# Patient Record
Sex: MALE | Race: WHITE
[De-identification: names, ages, dates, MRNs, and addresses within clinical notes are randomized per-mention and may not be internally consistent; named-entity substitution may affect disease eponyms.]

---

## 2018-05-04 ENCOUNTER — HOSPITAL ENCOUNTER (OUTPATIENT)
Dept: HOSPITAL 100 - MTRAD | Age: 73
End: 2018-05-04
Payer: MEDICARE

## 2018-05-04 DIAGNOSIS — R05: Primary | ICD-10-CM

## 2018-05-04 PROCEDURE — 71046 X-RAY EXAM CHEST 2 VIEWS: CPT

## 2018-07-10 ENCOUNTER — HOSPITAL ENCOUNTER (OUTPATIENT)
Dept: HOSPITAL 100 - ED | Age: 73
Setting detail: OBSERVATION
Discharge: HOME | End: 2018-07-10
Payer: MEDICARE

## 2018-07-10 VITALS
HEART RATE: 73 BPM | OXYGEN SATURATION: 99 % | RESPIRATION RATE: 16 BRPM | SYSTOLIC BLOOD PRESSURE: 123 MMHG | DIASTOLIC BLOOD PRESSURE: 67 MMHG | TEMPERATURE: 98.6 F

## 2018-07-10 VITALS
RESPIRATION RATE: 18 BRPM | HEART RATE: 61 BPM | DIASTOLIC BLOOD PRESSURE: 65 MMHG | SYSTOLIC BLOOD PRESSURE: 134 MMHG | OXYGEN SATURATION: 100 %

## 2018-07-10 VITALS
TEMPERATURE: 98.42 F | RESPIRATION RATE: 16 BRPM | DIASTOLIC BLOOD PRESSURE: 72 MMHG | OXYGEN SATURATION: 99 % | HEART RATE: 60 BPM | SYSTOLIC BLOOD PRESSURE: 166 MMHG

## 2018-07-10 VITALS
SYSTOLIC BLOOD PRESSURE: 134 MMHG | HEART RATE: 59 BPM | RESPIRATION RATE: 16 BRPM | OXYGEN SATURATION: 100 % | DIASTOLIC BLOOD PRESSURE: 65 MMHG

## 2018-07-10 VITALS
RESPIRATION RATE: 15 BRPM | BODY MASS INDEX: 28.2 KG/M2 | SYSTOLIC BLOOD PRESSURE: 153 MMHG | DIASTOLIC BLOOD PRESSURE: 89 MMHG | BODY MASS INDEX: 27.1 KG/M2 | TEMPERATURE: 98.06 F | OXYGEN SATURATION: 97 % | HEART RATE: 67 BPM | BODY MASS INDEX: 27.2 KG/M2

## 2018-07-10 VITALS — HEART RATE: 59 BPM

## 2018-07-10 VITALS — HEART RATE: 65 BPM

## 2018-07-10 VITALS — HEART RATE: 63 BPM

## 2018-07-10 DIAGNOSIS — Z79.899: ICD-10-CM

## 2018-07-10 DIAGNOSIS — Z79.02: ICD-10-CM

## 2018-07-10 DIAGNOSIS — E03.9: ICD-10-CM

## 2018-07-10 DIAGNOSIS — I10: ICD-10-CM

## 2018-07-10 DIAGNOSIS — Z79.84: ICD-10-CM

## 2018-07-10 DIAGNOSIS — I25.10: ICD-10-CM

## 2018-07-10 DIAGNOSIS — I25.2: ICD-10-CM

## 2018-07-10 DIAGNOSIS — R42: ICD-10-CM

## 2018-07-10 DIAGNOSIS — E78.5: ICD-10-CM

## 2018-07-10 DIAGNOSIS — E11.9: ICD-10-CM

## 2018-07-10 DIAGNOSIS — R07.89: Primary | ICD-10-CM

## 2018-07-10 DIAGNOSIS — Z95.1: ICD-10-CM

## 2018-07-10 DIAGNOSIS — Z87.891: ICD-10-CM

## 2018-07-10 DIAGNOSIS — Z79.82: ICD-10-CM

## 2018-07-10 LAB
ANION GAP: 10 (ref 5–15)
BUN SERPL-MCNC: 16 MG/DL (ref 7–18)
BUN/CREAT RATIO: 13 RATIO (ref 10–20)
CALCIUM SERPL-MCNC: 9 MG/DL (ref 8.5–10.1)
CARBON DIOXIDE: 28 MMOL/L (ref 21–32)
CHLORIDE: 103 MMOL/L (ref 98–107)
DEPRECATED RDW RBC: 41.6 FL (ref 35.1–43.9)
DIFFERENTIAL INDICATED: (no result)
ERYTHROCYTE [DISTWIDTH] IN BLOOD: 12.8 % (ref 11.6–14.6)
EST GLOM FILT RATE - AFR AMER: 74 ML/MIN (ref 60–?)
ESTIMATED CREATININE CLEARANCE: 51.75 ML/MIN
GLUCOSE: 112 MG/DL (ref 74–106)
HCT VFR BLD AUTO: 40.4 % (ref 40–54)
HEMOGLOBIN: 13.9 G/DL (ref 13–16.5)
HGB BLD-MCNC: 13.9 G/DL (ref 13–16.5)
IMMATURE GRANULOCYTES COUNT: 0 X10^3/UL (ref 0–0)
MCV RBC: 89.8 FL (ref 80–94)
MEAN CORP HGB CONC: 34.4 G/GL (ref 32–36)
MEAN PLATELET VOL.: 9.4 FL (ref 6.2–12)
PLATELET # BLD: 319 K/MM3 (ref 150–450)
PLATELET COUNT: 319 K/MM3 (ref 150–450)
POSITIVE COUNT: NO
POSITIVE DIFFERENTIAL: NO
POSITIVE MORPHOLOGY: NO
POTASSIUM: 3.3 MMOL/L (ref 3.5–5.1)
RBC # BLD AUTO: 4.5 M/MM3 (ref 4.6–6.2)
RBC DISTRIBUTION WIDTH CV: 12.8 % (ref 11.6–14.6)
RBC DISTRIBUTION WIDTH SD: 41.6 FL (ref 35.1–43.9)
WBC # BLD AUTO: 8.9 K/MM3 (ref 4.4–11)
WHITE BLOOD COUNT: 8.9 K/MM3 (ref 4.4–11)

## 2018-07-10 PROCEDURE — 99218: CPT

## 2018-07-10 PROCEDURE — A4216 STERILE WATER/SALINE, 10 ML: HCPCS

## 2018-07-10 PROCEDURE — 93017 CV STRESS TEST TRACING ONLY: CPT

## 2018-07-10 PROCEDURE — 85025 COMPLETE CBC W/AUTO DIFF WBC: CPT

## 2018-07-10 PROCEDURE — A9500 TC99M SESTAMIBI: HCPCS

## 2018-07-10 PROCEDURE — 71046 X-RAY EXAM CHEST 2 VIEWS: CPT

## 2018-07-10 PROCEDURE — 93005 ELECTROCARDIOGRAM TRACING: CPT

## 2018-07-10 PROCEDURE — 83036 HEMOGLOBIN GLYCOSYLATED A1C: CPT

## 2018-07-10 PROCEDURE — 84484 ASSAY OF TROPONIN QUANT: CPT

## 2018-07-10 PROCEDURE — 80048 BASIC METABOLIC PNL TOTAL CA: CPT

## 2018-07-10 PROCEDURE — 99283 EMERGENCY DEPT VISIT LOW MDM: CPT

## 2018-07-10 PROCEDURE — 82962 GLUCOSE BLOOD TEST: CPT

## 2018-07-10 PROCEDURE — 78452 HT MUSCLE IMAGE SPECT MULT: CPT

## 2018-07-10 PROCEDURE — G0378 HOSPITAL OBSERVATION PER HR: HCPCS

## 2018-07-10 RX ADMIN — Medication 1 CAP: at 13:51

## 2018-08-06 ENCOUNTER — HOSPITAL ENCOUNTER (OUTPATIENT)
Age: 73
End: 2018-08-06
Payer: MEDICARE

## 2018-08-06 DIAGNOSIS — I10: ICD-10-CM

## 2018-08-06 DIAGNOSIS — E87.6: ICD-10-CM

## 2018-08-06 DIAGNOSIS — E78.5: ICD-10-CM

## 2018-08-06 DIAGNOSIS — I25.10: Primary | ICD-10-CM

## 2018-08-06 DIAGNOSIS — N40.0: ICD-10-CM

## 2018-08-06 DIAGNOSIS — Z12.5: ICD-10-CM

## 2018-08-06 LAB
ANION GAP: 10 (ref 5–15)
BUN SERPL-MCNC: 11 MG/DL (ref 7–18)
BUN/CREAT RATIO: 9.2 RATIO (ref 10–20)
CALCIUM SERPL-MCNC: 8.7 MG/DL (ref 8.5–10.1)
CARBON DIOXIDE: 27 MMOL/L (ref 21–32)
CHLORIDE: 103 MMOL/L (ref 98–107)
CHOLEST SERPL-MCNC: 171 MG/DL
EST GLOM FILT RATE - AFR AMER: 77 ML/MIN (ref 60–?)
GLUCOSE: 121 MG/DL (ref 74–106)
POTASSIUM: 3.6 MMOL/L (ref 3.5–5.1)
PSA,TOTAL - ANNUAL SCREEN: 2.22 NG/ML (ref 0–4)
TRIGLYCERIDES: 234 MG/DL
VLDLC SERPL-MCNC: 47 MG/DL (ref 5–40)

## 2018-08-06 PROCEDURE — G0103 PSA SCREENING: HCPCS

## 2018-08-06 PROCEDURE — 84439 ASSAY OF FREE THYROXINE: CPT

## 2018-08-06 PROCEDURE — 36415 COLL VENOUS BLD VENIPUNCTURE: CPT

## 2018-08-06 PROCEDURE — 80061 LIPID PANEL: CPT

## 2018-08-06 PROCEDURE — 84153 ASSAY OF PSA TOTAL: CPT

## 2018-08-06 PROCEDURE — 84443 ASSAY THYROID STIM HORMONE: CPT

## 2018-08-06 PROCEDURE — 80048 BASIC METABOLIC PNL TOTAL CA: CPT

## 2018-12-12 ENCOUNTER — HOSPITAL ENCOUNTER (OUTPATIENT)
Dept: HOSPITAL 100 - MTRAD | Age: 73
End: 2018-12-12
Payer: MEDICARE

## 2018-12-12 VITALS — BODY MASS INDEX: 26.9 KG/M2

## 2018-12-12 DIAGNOSIS — R05: Primary | ICD-10-CM

## 2018-12-12 PROCEDURE — 71046 X-RAY EXAM CHEST 2 VIEWS: CPT

## 2019-02-26 ENCOUNTER — HOSPITAL ENCOUNTER (OUTPATIENT)
Dept: HOSPITAL 100 - MRI | Age: 74
End: 2019-02-26
Payer: MEDICARE

## 2019-02-26 VITALS — BODY MASS INDEX: 26.9 KG/M2

## 2019-02-26 DIAGNOSIS — F03.90: Primary | ICD-10-CM

## 2019-02-26 PROCEDURE — 70551 MRI BRAIN STEM W/O DYE: CPT

## 2019-05-09 ENCOUNTER — HOSPITAL ENCOUNTER (OUTPATIENT)
Age: 74
End: 2019-05-09
Payer: MEDICARE

## 2019-05-09 VITALS — BODY MASS INDEX: 26.9 KG/M2

## 2019-05-09 DIAGNOSIS — E55.9: Primary | ICD-10-CM

## 2019-05-09 DIAGNOSIS — F03.90: ICD-10-CM

## 2019-05-09 LAB
VITAMIN B12: 367 PG/ML (ref 211–911)
VITAMIN D,25 HYDROXY: 9.6 NG/ML (ref 29.95–100.01)

## 2019-05-09 PROCEDURE — 82607 VITAMIN B-12: CPT

## 2019-05-09 PROCEDURE — 36415 COLL VENOUS BLD VENIPUNCTURE: CPT

## 2019-05-09 PROCEDURE — 82306 VITAMIN D 25 HYDROXY: CPT

## 2019-09-29 ENCOUNTER — HOSPITAL ENCOUNTER (OUTPATIENT)
Dept: HOSPITAL 100 - ED | Age: 74
Setting detail: OBSERVATION
LOS: 1 days | Discharge: HOME | End: 2019-09-30
Payer: MEDICARE

## 2019-09-29 VITALS
HEART RATE: 63 BPM | RESPIRATION RATE: 16 BRPM | DIASTOLIC BLOOD PRESSURE: 72 MMHG | SYSTOLIC BLOOD PRESSURE: 150 MMHG | OXYGEN SATURATION: 99 % | TEMPERATURE: 97.5 F

## 2019-09-29 VITALS
RESPIRATION RATE: 17 BRPM | HEART RATE: 56 BPM | DIASTOLIC BLOOD PRESSURE: 68 MMHG | SYSTOLIC BLOOD PRESSURE: 139 MMHG | OXYGEN SATURATION: 100 %

## 2019-09-29 VITALS
SYSTOLIC BLOOD PRESSURE: 130 MMHG | HEART RATE: 58 BPM | RESPIRATION RATE: 15 BRPM | DIASTOLIC BLOOD PRESSURE: 62 MMHG | OXYGEN SATURATION: 100 %

## 2019-09-29 VITALS — OXYGEN SATURATION: 98 %

## 2019-09-29 VITALS — SYSTOLIC BLOOD PRESSURE: 143 MMHG | DIASTOLIC BLOOD PRESSURE: 68 MMHG | HEART RATE: 60 BPM

## 2019-09-29 VITALS
RESPIRATION RATE: 18 BRPM | SYSTOLIC BLOOD PRESSURE: 143 MMHG | DIASTOLIC BLOOD PRESSURE: 68 MMHG | OXYGEN SATURATION: 100 % | HEART RATE: 60 BPM | TEMPERATURE: 97.5 F

## 2019-09-29 VITALS
RESPIRATION RATE: 18 BRPM | OXYGEN SATURATION: 100 % | HEART RATE: 68 BPM | DIASTOLIC BLOOD PRESSURE: 68 MMHG | SYSTOLIC BLOOD PRESSURE: 146 MMHG

## 2019-09-29 VITALS — BODY MASS INDEX: 25.2 KG/M2 | BODY MASS INDEX: 26.9 KG/M2 | BODY MASS INDEX: 24.8 KG/M2

## 2019-09-29 VITALS — TEMPERATURE: 97.88 F

## 2019-09-29 VITALS — TEMPERATURE: 97.52 F

## 2019-09-29 DIAGNOSIS — Z79.899: ICD-10-CM

## 2019-09-29 DIAGNOSIS — I25.2: ICD-10-CM

## 2019-09-29 DIAGNOSIS — N17.9: ICD-10-CM

## 2019-09-29 DIAGNOSIS — I25.5: ICD-10-CM

## 2019-09-29 DIAGNOSIS — Z79.82: ICD-10-CM

## 2019-09-29 DIAGNOSIS — R07.89: Primary | ICD-10-CM

## 2019-09-29 DIAGNOSIS — I25.10: ICD-10-CM

## 2019-09-29 DIAGNOSIS — Z79.84: ICD-10-CM

## 2019-09-29 DIAGNOSIS — Z95.1: ICD-10-CM

## 2019-09-29 DIAGNOSIS — E11.9: ICD-10-CM

## 2019-09-29 DIAGNOSIS — R19.7: ICD-10-CM

## 2019-09-29 DIAGNOSIS — G20: ICD-10-CM

## 2019-09-29 DIAGNOSIS — D64.9: ICD-10-CM

## 2019-09-29 DIAGNOSIS — I10: ICD-10-CM

## 2019-09-29 DIAGNOSIS — R06.02: ICD-10-CM

## 2019-09-29 DIAGNOSIS — R42: ICD-10-CM

## 2019-09-29 DIAGNOSIS — E03.9: ICD-10-CM

## 2019-09-29 DIAGNOSIS — F32.9: ICD-10-CM

## 2019-09-29 DIAGNOSIS — Z79.02: ICD-10-CM

## 2019-09-29 DIAGNOSIS — F41.9: ICD-10-CM

## 2019-09-29 DIAGNOSIS — E78.5: ICD-10-CM

## 2019-09-29 DIAGNOSIS — Z87.891: ICD-10-CM

## 2019-09-29 DIAGNOSIS — R11.0: ICD-10-CM

## 2019-09-29 DIAGNOSIS — F02.80: ICD-10-CM

## 2019-09-29 LAB
ANION GAP: 9 (ref 5–15)
BUN SERPL-MCNC: 30 MG/DL (ref 7–18)
BUN/CREAT RATIO: 15.5 RATIO (ref 10–20)
CALCIUM SERPL-MCNC: 9 MG/DL (ref 8.5–10.1)
CARBON DIOXIDE: 23 MMOL/L (ref 21–32)
CHLORIDE: 105 MMOL/L (ref 98–107)
DEPRECATED RDW RBC: 43.8 FL (ref 35.1–43.9)
ERYTHROCYTE [DISTWIDTH] IN BLOOD: 12.8 % (ref 11.6–14.6)
EST GLOM FILT RATE - AFR AMER: 44 ML/MIN (ref 60–?)
ESTIMATED CREATININE CLEARANCE: 32.49 ML/MIN
GLUCOSE: 167 MG/DL (ref 74–106)
HCT VFR BLD AUTO: 38.6 % (ref 40–54)
HEMOGLOBIN: 12.7 G/DL (ref 13–16.5)
HGB BLD-MCNC: 12.7 G/DL (ref 13–16.5)
IMMATURE GRANULOCYTES COUNT: 0.05 X10^3/UL (ref 0–0)
MAGNESIUM: 1.7 MG/DL (ref 1.6–2.6)
MCV RBC: 93.9 FL (ref 80–94)
MEAN CORP HGB CONC: 32.9 G/DL (ref 32–36)
MEAN PLATELET VOL.: 9.9 FL (ref 6.2–12)
NRBC FLAGGED BY ANALYZER: 0 % (ref 0–5)
PLATELET # BLD: 297 K/MM3 (ref 150–450)
PLATELET COUNT: 297 K/MM3 (ref 150–450)
POTASSIUM: 4 MMOL/L (ref 3.5–5.1)
RBC # BLD AUTO: 4.11 M/MM3 (ref 4.6–6.2)
RBC DISTRIBUTION WIDTH CV: 12.8 % (ref 11.6–14.6)
RBC DISTRIBUTION WIDTH SD: 43.8 FL (ref 35.1–43.9)
WBC # BLD AUTO: 13.5 K/MM3 (ref 4.4–11)
WHITE BLOOD COUNT: 13.5 K/MM3 (ref 4.4–11)

## 2019-09-29 PROCEDURE — 99218: CPT

## 2019-09-29 PROCEDURE — 36415 COLL VENOUS BLD VENIPUNCTURE: CPT

## 2019-09-29 PROCEDURE — 96361 HYDRATE IV INFUSION ADD-ON: CPT

## 2019-09-29 PROCEDURE — 84484 ASSAY OF TROPONIN QUANT: CPT

## 2019-09-29 PROCEDURE — 85025 COMPLETE CBC W/AUTO DIFF WBC: CPT

## 2019-09-29 PROCEDURE — 93017 CV STRESS TEST TRACING ONLY: CPT

## 2019-09-29 PROCEDURE — 80061 LIPID PANEL: CPT

## 2019-09-29 PROCEDURE — 80048 BASIC METABOLIC PNL TOTAL CA: CPT

## 2019-09-29 PROCEDURE — 99285 EMERGENCY DEPT VISIT HI MDM: CPT

## 2019-09-29 PROCEDURE — 96374 THER/PROPH/DIAG INJ IV PUSH: CPT

## 2019-09-29 PROCEDURE — A9500 TC99M SESTAMIBI: HCPCS

## 2019-09-29 PROCEDURE — 83735 ASSAY OF MAGNESIUM: CPT

## 2019-09-29 PROCEDURE — 78452 HT MUSCLE IMAGE SPECT MULT: CPT

## 2019-09-29 PROCEDURE — G0378 HOSPITAL OBSERVATION PER HR: HCPCS

## 2019-09-29 PROCEDURE — A4216 STERILE WATER/SALINE, 10 ML: HCPCS

## 2019-09-29 PROCEDURE — 97166 OT EVAL MOD COMPLEX 45 MIN: CPT

## 2019-09-29 PROCEDURE — 93005 ELECTROCARDIOGRAM TRACING: CPT

## 2019-09-29 PROCEDURE — C8929 TTE W OR WO FOL WCON,DOPPLER: HCPCS

## 2019-09-29 PROCEDURE — 93306 TTE W/DOPPLER COMPLETE: CPT

## 2019-09-29 PROCEDURE — 71045 X-RAY EXAM CHEST 1 VIEW: CPT

## 2019-09-29 PROCEDURE — 97802 MEDICAL NUTRITION INDIV IN: CPT

## 2019-09-29 PROCEDURE — 97162 PT EVAL MOD COMPLEX 30 MIN: CPT

## 2019-09-29 PROCEDURE — 82962 GLUCOSE BLOOD TEST: CPT

## 2019-09-29 RX ADMIN — SODIUM CHLORIDE 1000 ML: 9 INJECTION, SOLUTION INTRAVENOUS at 23:01

## 2019-09-30 VITALS
OXYGEN SATURATION: 98 % | DIASTOLIC BLOOD PRESSURE: 68 MMHG | HEART RATE: 60 BPM | SYSTOLIC BLOOD PRESSURE: 146 MMHG | TEMPERATURE: 97.7 F | RESPIRATION RATE: 14 BRPM

## 2019-09-30 VITALS — DIASTOLIC BLOOD PRESSURE: 63 MMHG | HEART RATE: 60 BPM | SYSTOLIC BLOOD PRESSURE: 135 MMHG

## 2019-09-30 VITALS — HEART RATE: 51 BPM

## 2019-09-30 VITALS
HEART RATE: 54 BPM | RESPIRATION RATE: 18 BRPM | SYSTOLIC BLOOD PRESSURE: 139 MMHG | DIASTOLIC BLOOD PRESSURE: 64 MMHG | TEMPERATURE: 97.3 F | OXYGEN SATURATION: 98 %

## 2019-09-30 VITALS
SYSTOLIC BLOOD PRESSURE: 153 MMHG | OXYGEN SATURATION: 100 % | RESPIRATION RATE: 16 BRPM | DIASTOLIC BLOOD PRESSURE: 70 MMHG | TEMPERATURE: 97.52 F | HEART RATE: 56 BPM

## 2019-09-30 VITALS
SYSTOLIC BLOOD PRESSURE: 135 MMHG | OXYGEN SATURATION: 98 % | DIASTOLIC BLOOD PRESSURE: 63 MMHG | RESPIRATION RATE: 14 BRPM | HEART RATE: 60 BPM | TEMPERATURE: 96.5 F

## 2019-09-30 VITALS — HEART RATE: 55 BPM

## 2019-09-30 VITALS — HEART RATE: 52 BPM

## 2019-09-30 VITALS — OXYGEN SATURATION: 97 %

## 2019-09-30 LAB
ANION GAP: 9 (ref 5–15)
BUN SERPL-MCNC: 26 MG/DL (ref 7–18)
BUN/CREAT RATIO: 16.9 RATIO (ref 10–20)
CALCIUM SERPL-MCNC: 8.6 MG/DL (ref 8.5–10.1)
CARBON DIOXIDE: 24 MMOL/L (ref 21–32)
CHLORIDE: 108 MMOL/L (ref 98–107)
CHOLEST SERPL-MCNC: 119 MG/DL
DEPRECATED RDW RBC: 43.5 FL (ref 35.1–43.9)
ERYTHROCYTE [DISTWIDTH] IN BLOOD: 12.8 % (ref 11.6–14.6)
EST GLOM FILT RATE - AFR AMER: 57 ML/MIN (ref 60–?)
ESTIMATED CREATININE CLEARANCE: 40.71 ML/MIN
GLUCOSE: 100 MG/DL (ref 74–106)
HCT VFR BLD AUTO: 36.1 % (ref 40–54)
HEMOGLOBIN: 12.1 G/DL (ref 13–16.5)
HGB BLD-MCNC: 12.1 G/DL (ref 13–16.5)
IMMATURE GRANULOCYTES COUNT: 0.03 X10^3/UL (ref 0–0)
MCV RBC: 92.3 FL (ref 80–94)
MEAN CORP HGB CONC: 33.5 G/DL (ref 32–36)
MEAN PLATELET VOL.: 9.7 FL (ref 6.2–12)
NRBC FLAGGED BY ANALYZER: 0 % (ref 0–5)
PLATELET # BLD: 257 K/MM3 (ref 150–450)
PLATELET COUNT: 257 K/MM3 (ref 150–450)
POTASSIUM: 4 MMOL/L (ref 3.5–5.1)
RBC # BLD AUTO: 3.91 M/MM3 (ref 4.6–6.2)
RBC DISTRIBUTION WIDTH CV: 12.8 % (ref 11.6–14.6)
RBC DISTRIBUTION WIDTH SD: 43.5 FL (ref 35.1–43.9)
TRIGLYCERIDES: 54 MG/DL
VLDLC SERPL-MCNC: 11 MG/DL (ref 5–40)
WBC # BLD AUTO: 9 K/MM3 (ref 4.4–11)
WHITE BLOOD COUNT: 9 K/MM3 (ref 4.4–11)

## 2019-09-30 RX ADMIN — SODIUM CHLORIDE 125 ML: 9 INJECTION, SOLUTION INTRAVENOUS at 05:59

## 2019-09-30 RX ADMIN — MEMANTINE HYDROCHLORIDE 10 MG: 10 TABLET, FILM COATED ORAL at 10:54

## 2019-09-30 RX ADMIN — Medication 120 ML: at 18:00

## 2020-06-02 ENCOUNTER — HOSPITAL ENCOUNTER (OUTPATIENT)
Dept: HOSPITAL 100 - BIMLAB | Age: 75
End: 2020-06-02
Payer: MEDICARE

## 2020-06-02 VITALS — BODY MASS INDEX: 24.8 KG/M2

## 2020-06-02 DIAGNOSIS — I10: ICD-10-CM

## 2020-06-02 DIAGNOSIS — E11.9: Primary | ICD-10-CM

## 2020-06-02 LAB
ALANINE AMINOTRANSFER ALT/SGPT: 21 U/L (ref 16–61)
ALBUMIN SERPL-MCNC: 3.7 G/DL (ref 3.2–5)
ALKALINE PHOSPHATASE: 71 U/L (ref 45–117)
ANION GAP: 10 (ref 5–15)
AST(SGOT): 15 U/L (ref 15–37)
BUN SERPL-MCNC: 24 MG/DL (ref 7–18)
BUN/CREAT RATIO: 13 RATIO (ref 10–20)
CALCIUM SERPL-MCNC: 9.1 MG/DL (ref 8.5–10.1)
CARBON DIOXIDE: 26 MMOL/L (ref 21–32)
CHLORIDE: 104 MMOL/L (ref 98–107)
CHOLEST SERPL-MCNC: 150 MG/DL
CREAT UR-MCNC: 27.7 MG/DL
DEPRECATED RDW RBC: 44.9 FL (ref 35.1–43.9)
ERYTHROCYTE [DISTWIDTH] IN BLOOD: 13.1 % (ref 11.6–14.6)
EST GLOM FILT RATE - AFR AMER: 46 ML/MIN (ref 60–?)
GLOBULIN: 3.8 G/DL (ref 2.2–4.2)
GLUCOSE: 134 MG/DL (ref 74–106)
HCT VFR BLD AUTO: 36.6 % (ref 40–54)
HEMOGLOBIN: 11.8 G/DL (ref 13–16.5)
HGB BLD-MCNC: 11.8 G/DL (ref 13–16.5)
IMMATURE GRANULOCYTES COUNT: 0.02 X10^3/UL (ref 0–0)
MCV RBC: 93.6 FL (ref 80–94)
MEAN CORP HGB CONC: 32.2 G/DL (ref 32–36)
MEAN PLATELET VOL.: 10 FL (ref 6.2–12)
MICROALBUMIN UR-MCNC: 8.4 MG/L
NRBC FLAGGED BY ANALYZER: 0 % (ref 0–5)
PLATELET # BLD: 274 K/MM3 (ref 150–450)
PLATELET COUNT: 274 K/MM3 (ref 150–450)
POTASSIUM: 3.8 MMOL/L (ref 3.5–5.1)
RBC # BLD AUTO: 3.91 M/MM3 (ref 4.6–6.2)
RBC DISTRIBUTION WIDTH CV: 13.1 % (ref 11.6–14.6)
RBC DISTRIBUTION WIDTH SD: 44.9 FL (ref 35.1–43.9)
TRIGLYCERIDES: 116 MG/DL
VLDLC SERPL-MCNC: 23 MG/DL (ref 5–40)
WBC # BLD AUTO: 8.4 K/MM3 (ref 4.4–11)
WHITE BLOOD COUNT: 8.4 K/MM3 (ref 4.4–11)

## 2020-06-02 PROCEDURE — 36415 COLL VENOUS BLD VENIPUNCTURE: CPT

## 2020-06-02 PROCEDURE — 85025 COMPLETE CBC W/AUTO DIFF WBC: CPT

## 2020-06-02 PROCEDURE — 82043 UR ALBUMIN QUANTITATIVE: CPT

## 2020-06-02 PROCEDURE — 82570 ASSAY OF URINE CREATININE: CPT

## 2020-06-02 PROCEDURE — 80061 LIPID PANEL: CPT

## 2020-06-02 PROCEDURE — 80053 COMPREHEN METABOLIC PANEL: CPT

## 2020-10-06 ENCOUNTER — HOSPITAL ENCOUNTER (OUTPATIENT)
Dept: HOSPITAL 100 - BIMLAB | Age: 75
End: 2020-10-06
Payer: MEDICARE

## 2020-10-06 VITALS — BODY MASS INDEX: 24.8 KG/M2

## 2020-10-06 DIAGNOSIS — E55.9: Primary | ICD-10-CM

## 2020-10-06 DIAGNOSIS — E11.9: ICD-10-CM

## 2020-10-06 LAB
ANION GAP: 2 (ref 5–15)
BUN SERPL-MCNC: 18 MG/DL (ref 7–18)
BUN/CREAT RATIO: 12.3 RATIO (ref 10–20)
CALCIUM SERPL-MCNC: 8.6 MG/DL (ref 8.5–10.1)
CARBON DIOXIDE: 30 MMOL/L (ref 21–32)
CHLORIDE: 104 MMOL/L (ref 98–107)
EST GLOM FILT RATE - AFR AMER: 60 ML/MIN (ref 60–?)
GLUCOSE: 190 MG/DL (ref 74–106)
POTASSIUM: 4 MMOL/L (ref 3.5–5.1)

## 2020-10-06 PROCEDURE — 82306 VITAMIN D 25 HYDROXY: CPT

## 2020-10-06 PROCEDURE — 36415 COLL VENOUS BLD VENIPUNCTURE: CPT

## 2020-10-06 PROCEDURE — 80048 BASIC METABOLIC PNL TOTAL CA: CPT

## 2020-10-07 LAB — VITAMIN D,25 HYDROXY: 40.5 NG/ML

## 2021-02-12 ENCOUNTER — HOSPITAL ENCOUNTER (OUTPATIENT)
Age: 76
End: 2021-02-12
Payer: MEDICARE

## 2021-02-12 VITALS — BODY MASS INDEX: 29 KG/M2

## 2021-02-12 DIAGNOSIS — R26.89: Primary | ICD-10-CM

## 2021-02-12 DIAGNOSIS — R07.9: ICD-10-CM

## 2021-02-12 DIAGNOSIS — F03.90: ICD-10-CM

## 2021-02-12 DIAGNOSIS — I69.322: ICD-10-CM

## 2021-02-12 DIAGNOSIS — E03.9: ICD-10-CM

## 2021-02-12 LAB
ALANINE AMINOTRANSFER ALT/SGPT: 21 U/L (ref 16–61)
ALBUMIN SERPL-MCNC: 3.9 G/DL (ref 3.2–5)
ALKALINE PHOSPHATASE: 108 U/L (ref 45–117)
ANION GAP: 2 (ref 5–15)
AST(SGOT): 17 U/L (ref 15–37)
BUN SERPL-MCNC: 23 MG/DL (ref 7–18)
BUN/CREAT RATIO: 12.2 RATIO (ref 10–20)
CALCIUM SERPL-MCNC: 9 MG/DL (ref 8.5–10.1)
CARBON DIOXIDE: 30 MMOL/L (ref 21–32)
CHLORIDE: 102 MMOL/L (ref 98–107)
CHOLEST SERPL-MCNC: 152 MG/DL
DEPRECATED RDW RBC: 44.7 FL (ref 35.1–43.9)
ERYTHROCYTE [DISTWIDTH] IN BLOOD: 13.6 % (ref 11.6–14.6)
EST GLOM FILT RATE - AFR AMER: 45 ML/MIN (ref 60–?)
FOLATES, (FOLIC ACID): 11.4 NG/ML (ref 3.1–55.4)
GLOBULIN: 4.1 G/DL (ref 2.2–4.2)
GLUCOSE: 167 MG/DL (ref 74–106)
HCT VFR BLD AUTO: 41.2 % (ref 40–54)
HEMOGLOBIN: 13.5 G/DL (ref 13–16.5)
HGB BLD-MCNC: 13.5 G/DL (ref 13–16.5)
MCV RBC: 90.2 FL (ref 80–94)
MEAN CORP HGB CONC: 32.8 G/DL (ref 32–36)
MEAN PLATELET VOL.: 9.5 FL (ref 6.2–12)
PLATELET # BLD: 229 K/MM3 (ref 150–450)
PLATELET COUNT: 229 K/MM3 (ref 150–450)
POTASSIUM: 4.4 MMOL/L (ref 3.5–5.1)
RBC # BLD AUTO: 4.57 M/MM3 (ref 4.6–6.2)
RBC DISTRIBUTION WIDTH CV: 13.6 % (ref 11.6–14.6)
RBC DISTRIBUTION WIDTH SD: 44.7 FL (ref 35.1–43.9)
TRIGLYCERIDES: 127 MG/DL
VITAMIN B12: 322 PG/ML (ref 211–911)
VLDLC SERPL-MCNC: 25 MG/DL (ref 5–40)
WBC # BLD AUTO: 8.5 K/MM3 (ref 4.4–11)
WHITE BLOOD COUNT: 8.5 K/MM3 (ref 4.4–11)

## 2021-02-12 PROCEDURE — 80061 LIPID PANEL: CPT

## 2021-02-12 PROCEDURE — 82607 VITAMIN B-12: CPT

## 2021-02-12 PROCEDURE — 85027 COMPLETE CBC AUTOMATED: CPT

## 2021-02-12 PROCEDURE — 84443 ASSAY THYROID STIM HORMONE: CPT

## 2021-02-12 PROCEDURE — 80053 COMPREHEN METABOLIC PANEL: CPT

## 2021-02-12 PROCEDURE — 82746 ASSAY OF FOLIC ACID SERUM: CPT

## 2021-02-12 PROCEDURE — 36415 COLL VENOUS BLD VENIPUNCTURE: CPT

## 2021-02-12 PROCEDURE — 93880 EXTRACRANIAL BILAT STUDY: CPT

## 2021-04-08 ENCOUNTER — HOSPITAL ENCOUNTER (OUTPATIENT)
Age: 76
End: 2021-04-08
Payer: MEDICARE

## 2021-04-08 VITALS — BODY MASS INDEX: 29 KG/M2

## 2021-04-08 DIAGNOSIS — E03.9: Primary | ICD-10-CM

## 2021-04-08 PROCEDURE — 84443 ASSAY THYROID STIM HORMONE: CPT

## 2021-04-08 PROCEDURE — 36415 COLL VENOUS BLD VENIPUNCTURE: CPT

## 2021-04-26 ENCOUNTER — HOSPITAL ENCOUNTER (OUTPATIENT)
Age: 76
End: 2021-04-26
Payer: MEDICARE

## 2021-04-26 DIAGNOSIS — I10: ICD-10-CM

## 2021-04-26 DIAGNOSIS — E11.9: Primary | ICD-10-CM

## 2021-04-26 LAB
ANION GAP: 7 (ref 5–15)
BUN SERPL-MCNC: 35 MG/DL (ref 7–18)
BUN/CREAT RATIO: 17.9 RATIO (ref 10–20)
CALCIUM SERPL-MCNC: 9.4 MG/DL (ref 8.5–10.1)
CARBON DIOXIDE: 27 MMOL/L (ref 21–32)
CHLORIDE: 103 MMOL/L (ref 98–107)
EST GLOM FILT RATE - AFR AMER: 43 ML/MIN (ref 60–?)
GLUCOSE: 306 MG/DL (ref 74–106)
POTASSIUM: 4.1 MMOL/L (ref 3.5–5.1)

## 2021-04-26 PROCEDURE — 36415 COLL VENOUS BLD VENIPUNCTURE: CPT

## 2021-04-26 PROCEDURE — 80048 BASIC METABOLIC PNL TOTAL CA: CPT

## 2021-08-01 ENCOUNTER — HOSPITAL ENCOUNTER (OUTPATIENT)
Dept: HOSPITAL 100 - ED | Age: 76
Setting detail: OBSERVATION
LOS: 1 days | Discharge: HOME | End: 2021-08-02
Payer: MEDICARE

## 2021-08-01 VITALS
DIASTOLIC BLOOD PRESSURE: 76 MMHG | OXYGEN SATURATION: 99 % | SYSTOLIC BLOOD PRESSURE: 167 MMHG | HEART RATE: 63 BPM | RESPIRATION RATE: 15 BRPM

## 2021-08-01 VITALS
SYSTOLIC BLOOD PRESSURE: 188 MMHG | RESPIRATION RATE: 15 BRPM | OXYGEN SATURATION: 99 % | DIASTOLIC BLOOD PRESSURE: 94 MMHG | TEMPERATURE: 97.8 F | HEART RATE: 71 BPM

## 2021-08-01 VITALS
DIASTOLIC BLOOD PRESSURE: 76 MMHG | RESPIRATION RATE: 14 BRPM | HEART RATE: 59 BPM | SYSTOLIC BLOOD PRESSURE: 213 MMHG | OXYGEN SATURATION: 95 % | TEMPERATURE: 97.5 F

## 2021-08-01 VITALS — BODY MASS INDEX: 25.1 KG/M2 | BODY MASS INDEX: 27.3 KG/M2

## 2021-08-01 VITALS
TEMPERATURE: 98.24 F | RESPIRATION RATE: 18 BRPM | SYSTOLIC BLOOD PRESSURE: 176 MMHG | HEART RATE: 65 BPM | OXYGEN SATURATION: 98 % | DIASTOLIC BLOOD PRESSURE: 90 MMHG

## 2021-08-01 VITALS — HEART RATE: 65 BPM | DIASTOLIC BLOOD PRESSURE: 90 MMHG | SYSTOLIC BLOOD PRESSURE: 176 MMHG

## 2021-08-01 VITALS
DIASTOLIC BLOOD PRESSURE: 74 MMHG | RESPIRATION RATE: 16 BRPM | OXYGEN SATURATION: 98 % | HEART RATE: 55 BPM | SYSTOLIC BLOOD PRESSURE: 171 MMHG

## 2021-08-01 DIAGNOSIS — R19.7: ICD-10-CM

## 2021-08-01 DIAGNOSIS — D64.9: ICD-10-CM

## 2021-08-01 DIAGNOSIS — Z87.891: ICD-10-CM

## 2021-08-01 DIAGNOSIS — Z79.82: ICD-10-CM

## 2021-08-01 DIAGNOSIS — R06.02: ICD-10-CM

## 2021-08-01 DIAGNOSIS — R11.2: Primary | ICD-10-CM

## 2021-08-01 DIAGNOSIS — G31.83: ICD-10-CM

## 2021-08-01 DIAGNOSIS — Z79.899: ICD-10-CM

## 2021-08-01 DIAGNOSIS — Z95.1: ICD-10-CM

## 2021-08-01 DIAGNOSIS — E11.22: ICD-10-CM

## 2021-08-01 DIAGNOSIS — F41.9: ICD-10-CM

## 2021-08-01 DIAGNOSIS — Z79.02: ICD-10-CM

## 2021-08-01 DIAGNOSIS — E78.5: ICD-10-CM

## 2021-08-01 DIAGNOSIS — I12.9: ICD-10-CM

## 2021-08-01 DIAGNOSIS — N18.32: ICD-10-CM

## 2021-08-01 DIAGNOSIS — F02.80: ICD-10-CM

## 2021-08-01 DIAGNOSIS — F32.9: ICD-10-CM

## 2021-08-01 DIAGNOSIS — I25.10: ICD-10-CM

## 2021-08-01 DIAGNOSIS — Z79.4: ICD-10-CM

## 2021-08-01 DIAGNOSIS — I25.2: ICD-10-CM

## 2021-08-01 DIAGNOSIS — E03.9: ICD-10-CM

## 2021-08-01 LAB
ALANINE AMINOTRANSFER ALT/SGPT: 24 U/L (ref 16–61)
ALBUMIN SERPL-MCNC: 3.8 G/DL (ref 3.2–5)
ALKALINE PHOSPHATASE: 108 U/L (ref 45–117)
ANION GAP: 6 (ref 5–15)
AST(SGOT): 23 U/L (ref 15–37)
BUN SERPL-MCNC: 20 MG/DL (ref 7–18)
BUN/CREAT RATIO: 13.1 RATIO (ref 10–20)
CALCIUM SERPL-MCNC: 8.8 MG/DL (ref 8.5–10.1)
CARBON DIOXIDE: 25 MMOL/L (ref 21–32)
CHLORIDE: 103 MMOL/L (ref 98–107)
DEPRECATED RDW RBC: 40 FL (ref 35.1–43.9)
ERYTHROCYTE [DISTWIDTH] IN BLOOD: 12.1 % (ref 11.6–14.6)
EST GLOM FILT RATE - AFR AMER: 57 ML/MIN (ref 60–?)
ESTIMATED CREATININE CLEARANCE: 39.74 ML/MIN
GLOBULIN: 3.9 G/DL (ref 2.2–4.2)
GLUCOSE: 264 MG/DL (ref 74–106)
HCT VFR BLD AUTO: 42.3 % (ref 40–54)
HEMOGLOBIN: 14.4 G/DL (ref 13–16.5)
HGB BLD-MCNC: 14.4 G/DL (ref 13–16.5)
IMMATURE GRANULOCYTES COUNT: 0.05 X10^3/UL (ref 0–0)
LIPASE: 563 U/L (ref 73–393)
MCV RBC: 90 FL (ref 80–94)
MEAN CORP HGB CONC: 34 G/DL (ref 32–36)
MEAN PLATELET VOL.: 9.4 FL (ref 6.2–12)
NRBC FLAGGED BY ANALYZER: 0 % (ref 0–5)
PLATELET # BLD: 227 K/MM3 (ref 150–450)
PLATELET COUNT: 227 K/MM3 (ref 150–450)
POTASSIUM: 4 MMOL/L (ref 3.5–5.1)
RBC # BLD AUTO: 4.7 M/MM3 (ref 4.6–6.2)
RBC DISTRIBUTION WIDTH CV: 12.1 % (ref 11.6–14.6)
RBC DISTRIBUTION WIDTH SD: 40 FL (ref 35.1–43.9)
TROPONIN-I HS: 4.8 PG/ML (ref 3–78.5)
WBC # BLD AUTO: 11.7 K/MM3 (ref 4.4–11)
WHITE BLOOD COUNT: 11.7 K/MM3 (ref 4.4–11)

## 2021-08-01 PROCEDURE — 99285 EMERGENCY DEPT VISIT HI MDM: CPT

## 2021-08-01 PROCEDURE — G0378 HOSPITAL OBSERVATION PER HR: HCPCS

## 2021-08-01 PROCEDURE — 99218: CPT

## 2021-08-01 PROCEDURE — 82274 ASSAY TEST FOR BLOOD FECAL: CPT

## 2021-08-01 PROCEDURE — 83690 ASSAY OF LIPASE: CPT

## 2021-08-01 PROCEDURE — 96361 HYDRATE IV INFUSION ADD-ON: CPT

## 2021-08-01 PROCEDURE — 97162 PT EVAL MOD COMPLEX 30 MIN: CPT

## 2021-08-01 PROCEDURE — 96372 THER/PROPH/DIAG INJ SC/IM: CPT

## 2021-08-01 PROCEDURE — A4216 STERILE WATER/SALINE, 10 ML: HCPCS

## 2021-08-01 PROCEDURE — 36415 COLL VENOUS BLD VENIPUNCTURE: CPT

## 2021-08-01 PROCEDURE — 97166 OT EVAL MOD COMPLEX 45 MIN: CPT

## 2021-08-01 PROCEDURE — 82962 GLUCOSE BLOOD TEST: CPT

## 2021-08-01 PROCEDURE — 85025 COMPLETE CBC W/AUTO DIFF WBC: CPT

## 2021-08-01 PROCEDURE — 96375 TX/PRO/DX INJ NEW DRUG ADDON: CPT

## 2021-08-01 PROCEDURE — 96365 THER/PROPH/DIAG IV INF INIT: CPT

## 2021-08-01 PROCEDURE — 83605 ASSAY OF LACTIC ACID: CPT

## 2021-08-01 PROCEDURE — 84484 ASSAY OF TROPONIN QUANT: CPT

## 2021-08-01 PROCEDURE — 74177 CT ABD & PELVIS W/CONTRAST: CPT

## 2021-08-01 PROCEDURE — 71045 X-RAY EXAM CHEST 1 VIEW: CPT

## 2021-08-01 PROCEDURE — 80053 COMPREHEN METABOLIC PANEL: CPT

## 2021-08-01 RX ADMIN — MEMANTINE HYDROCHLORIDE 5 MG: 5 TABLET, FILM COATED ORAL at 20:52

## 2021-08-01 RX ADMIN — SODIUM CHLORIDE 150 ML: 9 INJECTION, SOLUTION INTRAVENOUS at 14:40

## 2021-08-01 RX ADMIN — SODIUM CHLORIDE 125 ML: 9 INJECTION, SOLUTION INTRAVENOUS at 20:34

## 2021-08-01 RX ADMIN — HEPARIN SODIUM 5000 UNIT: 5000 INJECTION, SOLUTION INTRAVENOUS; SUBCUTANEOUS at 20:47

## 2021-08-02 VITALS
DIASTOLIC BLOOD PRESSURE: 77 MMHG | HEART RATE: 54 BPM | TEMPERATURE: 97.7 F | RESPIRATION RATE: 18 BRPM | OXYGEN SATURATION: 97 % | SYSTOLIC BLOOD PRESSURE: 142 MMHG

## 2021-08-02 VITALS
DIASTOLIC BLOOD PRESSURE: 67 MMHG | OXYGEN SATURATION: 98 % | HEART RATE: 58 BPM | TEMPERATURE: 97.88 F | SYSTOLIC BLOOD PRESSURE: 145 MMHG | RESPIRATION RATE: 18 BRPM

## 2021-08-02 VITALS
RESPIRATION RATE: 18 BRPM | DIASTOLIC BLOOD PRESSURE: 67 MMHG | TEMPERATURE: 97.8 F | SYSTOLIC BLOOD PRESSURE: 145 MMHG | HEART RATE: 58 BPM | OXYGEN SATURATION: 98 %

## 2021-08-02 VITALS
HEART RATE: 59 BPM | OXYGEN SATURATION: 96 % | SYSTOLIC BLOOD PRESSURE: 139 MMHG | DIASTOLIC BLOOD PRESSURE: 48 MMHG | RESPIRATION RATE: 18 BRPM | TEMPERATURE: 97.7 F

## 2021-08-02 VITALS — SYSTOLIC BLOOD PRESSURE: 139 MMHG | DIASTOLIC BLOOD PRESSURE: 48 MMHG | HEART RATE: 59 BPM

## 2021-08-02 LAB
ALANINE AMINOTRANSFER ALT/SGPT: 19 U/L (ref 16–61)
ALBUMIN SERPL-MCNC: 3.2 G/DL (ref 3.2–5)
ALKALINE PHOSPHATASE: 99 U/L (ref 45–117)
ANION GAP: 5 (ref 5–15)
AST(SGOT): 17 U/L (ref 15–37)
BUN SERPL-MCNC: 16 MG/DL (ref 7–18)
BUN/CREAT RATIO: 13.7 RATIO (ref 10–20)
CALCIUM SERPL-MCNC: 8.3 MG/DL (ref 8.5–10.1)
CARBON DIOXIDE: 26 MMOL/L (ref 21–32)
CHLORIDE: 108 MMOL/L (ref 98–107)
DEPRECATED RDW RBC: 39.9 FL (ref 35.1–43.9)
ERYTHROCYTE [DISTWIDTH] IN BLOOD: 12.3 % (ref 11.6–14.6)
EST GLOM FILT RATE - AFR AMER: 78 ML/MIN (ref 60–?)
ESTIMATED CREATININE CLEARANCE: 50.22 ML/MIN
GLOBULIN: 3.4 G/DL (ref 2.2–4.2)
GLUCOSE: 98 MG/DL (ref 74–106)
HCT VFR BLD AUTO: 36.9 % (ref 40–54)
HEMOGLOBIN: 12.6 G/DL (ref 13–16.5)
HGB BLD-MCNC: 12.6 G/DL (ref 13–16.5)
IMMATURE GRANULOCYTES COUNT: 0.03 X10^3/UL (ref 0–0)
MCV RBC: 88.7 FL (ref 80–94)
MEAN CORP HGB CONC: 34.1 G/DL (ref 32–36)
MEAN PLATELET VOL.: 9.6 FL (ref 6.2–12)
NRBC FLAGGED BY ANALYZER: 0 % (ref 0–5)
PLATELET # BLD: 231 K/MM3 (ref 150–450)
PLATELET COUNT: 231 K/MM3 (ref 150–450)
POTASSIUM: 3.6 MMOL/L (ref 3.5–5.1)
RBC # BLD AUTO: 4.16 M/MM3 (ref 4.6–6.2)
RBC DISTRIBUTION WIDTH CV: 12.3 % (ref 11.6–14.6)
RBC DISTRIBUTION WIDTH SD: 39.9 FL (ref 35.1–43.9)
WBC # BLD AUTO: 9.1 K/MM3 (ref 4.4–11)
WHITE BLOOD COUNT: 9.1 K/MM3 (ref 4.4–11)

## 2021-08-02 RX ADMIN — HEPARIN SODIUM 5000 UNIT: 5000 INJECTION, SOLUTION INTRAVENOUS; SUBCUTANEOUS at 09:43

## 2021-08-02 RX ADMIN — SODIUM CHLORIDE 125 ML: 9 INJECTION, SOLUTION INTRAVENOUS at 03:58

## 2021-08-02 RX ADMIN — MEMANTINE HYDROCHLORIDE 5 MG: 5 TABLET, FILM COATED ORAL at 08:26

## 2021-09-13 ENCOUNTER — HOSPITAL ENCOUNTER (OUTPATIENT)
Dept: HOSPITAL 100 - BIMLAB | Age: 76
End: 2021-09-13
Payer: MEDICARE

## 2021-09-13 DIAGNOSIS — F02.80: ICD-10-CM

## 2021-09-13 DIAGNOSIS — R45.4: ICD-10-CM

## 2021-09-13 DIAGNOSIS — G31.83: Primary | ICD-10-CM

## 2021-09-13 LAB
ALANINE AMINOTRANSFER ALT/SGPT: 22 U/L (ref 16–61)
ALBUMIN SERPL-MCNC: 3.8 G/DL (ref 3.2–5)
ALKALINE PHOSPHATASE: 111 U/L (ref 45–117)
ANION GAP: 9 (ref 5–15)
AST(SGOT): 18 U/L (ref 15–37)
BUN SERPL-MCNC: 22 MG/DL (ref 7–18)
BUN/CREAT RATIO: 15.2 RATIO (ref 10–20)
CALCIUM SERPL-MCNC: 8.9 MG/DL (ref 8.5–10.1)
CARBON DIOXIDE: 25 MMOL/L (ref 21–32)
CHLORIDE: 105 MMOL/L (ref 98–107)
DEPRECATED RDW RBC: 42.2 FL (ref 35.1–43.9)
ERYTHROCYTE [DISTWIDTH] IN BLOOD: 12.9 % (ref 11.6–14.6)
EST GLOM FILT RATE - AFR AMER: 61 ML/MIN (ref 60–?)
GLOBULIN: 4.1 G/DL (ref 2.2–4.2)
GLUCOSE: 142 MG/DL (ref 74–106)
HCT VFR BLD AUTO: 44.8 % (ref 40–54)
HEMOGLOBIN: 15.3 G/DL (ref 13–16.5)
HGB BLD-MCNC: 15.3 G/DL (ref 13–16.5)
IMMATURE GRANULOCYTES COUNT: 0.03 X10^3/UL (ref 0–0)
MCV RBC: 89.4 FL (ref 80–94)
MEAN CORP HGB CONC: 34.2 G/DL (ref 32–36)
MEAN PLATELET VOL.: 9.8 FL (ref 6.2–12)
NRBC FLAGGED BY ANALYZER: 0 % (ref 0–5)
PLATELET # BLD: 282 K/MM3 (ref 150–450)
PLATELET COUNT: 282 K/MM3 (ref 150–450)
POTASSIUM: 3.6 MMOL/L (ref 3.5–5.1)
RBC # BLD AUTO: 5.01 M/MM3 (ref 4.6–6.2)
RBC DISTRIBUTION WIDTH CV: 12.9 % (ref 11.6–14.6)
RBC DISTRIBUTION WIDTH SD: 42.2 FL (ref 35.1–43.9)
WBC # BLD AUTO: 9.6 K/MM3 (ref 4.4–11)
WHITE BLOOD COUNT: 9.6 K/MM3 (ref 4.4–11)

## 2021-09-13 PROCEDURE — 80053 COMPREHEN METABOLIC PANEL: CPT

## 2021-09-13 PROCEDURE — 84443 ASSAY THYROID STIM HORMONE: CPT

## 2021-09-13 PROCEDURE — 85025 COMPLETE CBC W/AUTO DIFF WBC: CPT

## 2021-09-13 PROCEDURE — 36415 COLL VENOUS BLD VENIPUNCTURE: CPT

## 2022-01-10 ENCOUNTER — HOSPITAL ENCOUNTER (OUTPATIENT)
Dept: HOSPITAL 100 - BIMLAB | Age: 77
Discharge: ANOTHER HOSPITAL | End: 2022-01-10
Payer: MEDICARE

## 2022-01-10 DIAGNOSIS — I10: Primary | ICD-10-CM

## 2022-01-10 DIAGNOSIS — E03.9: ICD-10-CM

## 2022-01-10 DIAGNOSIS — E11.69: ICD-10-CM

## 2022-01-10 LAB
ANION GAP: 8 (ref 5–15)
BUN SERPL-MCNC: 21 MG/DL (ref 7–18)
BUN/CREAT RATIO: 12.9 RATIO (ref 10–20)
CALCIUM SERPL-MCNC: 9 MG/DL (ref 8.5–10.1)
CARBON DIOXIDE: 27 MMOL/L (ref 21–32)
CHLORIDE: 105 MMOL/L (ref 98–107)
EST GLOM FILT RATE - AFR AMER: 53 ML/MIN (ref 60–?)
GLUCOSE: 145 MG/DL (ref 74–106)
POTASSIUM: 4.1 MMOL/L (ref 3.5–5.1)

## 2022-01-10 PROCEDURE — 84443 ASSAY THYROID STIM HORMONE: CPT

## 2022-01-10 PROCEDURE — 36415 COLL VENOUS BLD VENIPUNCTURE: CPT

## 2022-01-10 PROCEDURE — 83036 HEMOGLOBIN GLYCOSYLATED A1C: CPT

## 2022-01-10 PROCEDURE — 80048 BASIC METABOLIC PNL TOTAL CA: CPT

## 2022-04-11 ENCOUNTER — HOSPITAL ENCOUNTER (OUTPATIENT)
Dept: HOSPITAL 100 - BIMLAB | Age: 77
Discharge: HOME | End: 2022-04-11
Payer: MEDICARE

## 2022-04-11 DIAGNOSIS — I10: Primary | ICD-10-CM

## 2022-04-11 DIAGNOSIS — E78.5: ICD-10-CM

## 2022-04-11 LAB
ALANINE AMINOTRANSFER ALT/SGPT: 21 U/L (ref 16–61)
ALBUMIN SERPL-MCNC: 3.8 G/DL (ref 3.2–5)
ALKALINE PHOSPHATASE: 101 U/L (ref 45–117)
ANION GAP: 4 (ref 5–15)
AST(SGOT): 12 U/L (ref 15–37)
BUN SERPL-MCNC: 23 MG/DL (ref 7–18)
BUN/CREAT RATIO: 13.1 RATIO (ref 10–20)
CALCIUM SERPL-MCNC: 8.5 MG/DL (ref 8.5–10.1)
CARBON DIOXIDE: 28 MMOL/L (ref 21–32)
CHLORIDE: 102 MMOL/L (ref 98–107)
DEPRECATED RDW RBC: 40.3 FL (ref 35.1–43.9)
ERYTHROCYTE [DISTWIDTH] IN BLOOD: 12.3 % (ref 11.6–14.6)
EST GLOM FILT RATE - AFR AMER: 49 ML/MIN (ref 60–?)
GLOBULIN: 3.8 G/DL (ref 2.2–4.2)
GLUCOSE: 296 MG/DL (ref 74–106)
HCT VFR BLD AUTO: 40.1 % (ref 40–54)
HEMOGLOBIN: 13.9 G/DL (ref 13–16.5)
HGB BLD-MCNC: 13.9 G/DL (ref 13–16.5)
IMMATURE GRANULOCYTES COUNT: 0.01 X10^3/UL (ref 0–0)
MCV RBC: 88.9 FL (ref 80–94)
MEAN CORP HGB CONC: 34.7 G/DL (ref 32–36)
MEAN PLATELET VOL.: 10.2 FL (ref 6.2–12)
NRBC FLAGGED BY ANALYZER: 0 % (ref 0–5)
PLATELET # BLD: 233 K/MM3 (ref 150–450)
PLATELET COUNT: 233 K/MM3 (ref 150–450)
POTASSIUM: 3.8 MMOL/L (ref 3.5–5.1)
RBC # BLD AUTO: 4.51 M/MM3 (ref 4.6–6.2)
RBC DISTRIBUTION WIDTH CV: 12.3 % (ref 11.6–14.6)
RBC DISTRIBUTION WIDTH SD: 40.3 FL (ref 35.1–43.9)
WBC # BLD AUTO: 7.3 K/MM3 (ref 4.4–11)
WHITE BLOOD COUNT: 7.3 K/MM3 (ref 4.4–11)

## 2022-04-11 PROCEDURE — 36415 COLL VENOUS BLD VENIPUNCTURE: CPT

## 2022-04-11 PROCEDURE — 85025 COMPLETE CBC W/AUTO DIFF WBC: CPT

## 2022-04-11 PROCEDURE — 80053 COMPREHEN METABOLIC PANEL: CPT

## 2022-06-07 ENCOUNTER — HOSPITAL ENCOUNTER (EMERGENCY)
Age: 77
Discharge: HOME | End: 2022-06-07
Payer: MEDICARE

## 2022-06-07 VITALS
RESPIRATION RATE: 14 BRPM | OXYGEN SATURATION: 100 % | SYSTOLIC BLOOD PRESSURE: 132 MMHG | DIASTOLIC BLOOD PRESSURE: 82 MMHG | HEART RATE: 73 BPM | TEMPERATURE: 98.24 F

## 2022-06-07 VITALS — BODY MASS INDEX: 25.4 KG/M2

## 2022-06-07 DIAGNOSIS — F02.80: ICD-10-CM

## 2022-06-07 DIAGNOSIS — I25.810: ICD-10-CM

## 2022-06-07 DIAGNOSIS — I10: ICD-10-CM

## 2022-06-07 DIAGNOSIS — Z79.899: ICD-10-CM

## 2022-06-07 DIAGNOSIS — I25.10: ICD-10-CM

## 2022-06-07 DIAGNOSIS — Z79.4: ICD-10-CM

## 2022-06-07 DIAGNOSIS — E11.9: ICD-10-CM

## 2022-06-07 DIAGNOSIS — M19.032: Primary | ICD-10-CM

## 2022-06-07 DIAGNOSIS — Z87.891: ICD-10-CM

## 2022-06-07 DIAGNOSIS — E78.5: ICD-10-CM

## 2022-06-07 DIAGNOSIS — G20: ICD-10-CM

## 2022-06-07 DIAGNOSIS — Z79.82: ICD-10-CM

## 2022-06-07 DIAGNOSIS — Z79.02: ICD-10-CM

## 2022-06-07 DIAGNOSIS — Z95.5: ICD-10-CM

## 2022-06-07 DIAGNOSIS — I25.2: ICD-10-CM

## 2022-06-07 PROCEDURE — 99282 EMERGENCY DEPT VISIT SF MDM: CPT

## 2022-06-07 PROCEDURE — 73130 X-RAY EXAM OF HAND: CPT

## 2023-03-22 ENCOUNTER — NURSING HOME VISIT (OUTPATIENT)
Dept: POST ACUTE CARE | Facility: EXTERNAL LOCATION | Age: 78
End: 2023-03-22
Payer: MEDICARE

## 2023-03-22 DIAGNOSIS — F03.C0 SEVERE DEMENTIA WITHOUT BEHAVIORAL DISTURBANCE, PSYCHOTIC DISTURBANCE, MOOD DISTURBANCE, OR ANXIETY, UNSPECIFIED DEMENTIA TYPE (MULTI): Primary | ICD-10-CM

## 2023-03-22 DIAGNOSIS — I10 HYPERTENSION, UNSPECIFIED TYPE: ICD-10-CM

## 2023-03-22 PROCEDURE — 99308 SBSQ NF CARE LOW MDM 20: CPT | Performed by: INTERNAL MEDICINE

## 2023-03-22 NOTE — PROGRESS NOTES
Pt is doing fine , no complaint  GA: Comfortable, no distress  ROS: No SOB  Medications reviewed  Head: Normal  Neck: Soft  Heart: Regular  Lungs: Clear  Abdomen: soft    Impression: clinically doing fine, continue current management    Problem List Items Addressed This Visit          Nervous    Severe dementia without behavioral disturbance, psychotic disturbance, mood disturbance, or anxiety - Primary       Circulatory    Hypertension

## 2023-03-22 NOTE — LETTER
Patient: Derik Meza  : 1945    Encounter Date: 2023    Pt is doing fine , no complaint  GA: Comfortable, no distress  ROS: No SOB  Medications reviewed  Head: Normal  Neck: Soft  Heart: Regular  Lungs: Clear  Abdomen: soft    Impression: clinically doing fine, continue current management    Problem List Items Addressed This Visit          Nervous    Severe dementia without behavioral disturbance, psychotic disturbance, mood disturbance, or anxiety - Primary       Circulatory    Hypertension            Electronically Signed By: Oscar Noel MD   3/22/23  5:52 PM

## 2023-04-10 ENCOUNTER — NURSING HOME VISIT (OUTPATIENT)
Dept: POST ACUTE CARE | Facility: EXTERNAL LOCATION | Age: 78
End: 2023-04-10
Payer: MEDICARE

## 2023-04-10 DIAGNOSIS — Z79.4 TYPE 2 DIABETES MELLITUS WITHOUT COMPLICATION, WITH LONG-TERM CURRENT USE OF INSULIN (MULTI): ICD-10-CM

## 2023-04-10 DIAGNOSIS — E11.9 TYPE 2 DIABETES MELLITUS WITHOUT COMPLICATION, WITH LONG-TERM CURRENT USE OF INSULIN (MULTI): ICD-10-CM

## 2023-04-10 DIAGNOSIS — L02.31 ABSCESS OF BUTTOCK, LEFT: Primary | ICD-10-CM

## 2023-04-10 PROCEDURE — 99308 SBSQ NF CARE LOW MDM 20: CPT | Performed by: NURSE PRACTITIONER

## 2023-04-10 ASSESSMENT — ENCOUNTER SYMPTOMS
FEVER: 0
CARDIOVASCULAR NEGATIVE: 1
GASTROINTESTINAL NEGATIVE: 1
CHILLS: 0
RESPIRATORY NEGATIVE: 1

## 2023-04-10 NOTE — LETTER
Patient: Derik Meza  : 1945    Encounter Date: 04/10/2023    Subjective  Patient ID: Derik Meza is a 78 y.o. male who presents for No chief complaint on file..  79 yo male at [facility] unit with dementia.  Staff reports resident developed firm knot on left buttocks.          Review of Systems   Constitutional:  Negative for chills and fever.   Respiratory: Negative.     Cardiovascular: Negative.    Gastrointestinal: Negative.    Skin:         Firm area on lower left buttocks       Objective  Physical Exam  Constitutional:       General: He is not in acute distress.     Appearance: He is not ill-appearing.   Cardiovascular:      Rate and Rhythm: Regular rhythm.   Pulmonary:      Effort: Pulmonary effort is normal.      Breath sounds: Normal breath sounds.   Skin:     General: Skin is warm and dry.      Comments: Left lower buttocks with erythema and warmth noted to firm raised abscess approximately 1.5 cm in size.  No drainage at this time.     Neurological:      Mental Status: He is alert.         No current outpatient medications on file.     Assessment/Plan  Problem List Items Addressed This Visit          Musculoskeletal    Abscess of buttock, left - Primary       Endocrine/Metabolic    Type 2 diabetes mellitus without complication, with long-term current use of insulin (CMS/[facility])   Abscess of buttock;  Will start him on Keflex 500mg bid x 10 days.  Keep area clean and dry;  continue to monitor.    DMII;  Hgba1c was 7.9.  Will increase his Lantus to 23 units at hs.  Continue to monitor BS.            Electronically Signed By: LOLY Chambers   4/10/23  1:16 PM

## 2023-04-10 NOTE — PROGRESS NOTES
Subjective   Patient ID: Derik Meza is a 78 y.o. male who presents for No chief complaint on file..  79 yo male at [facility] unit with dementia.  Staff reports resident developed firm knot on left buttocks.          Review of Systems   Constitutional:  Negative for chills and fever.   Respiratory: Negative.     Cardiovascular: Negative.    Gastrointestinal: Negative.    Skin:         Firm area on lower left buttocks       Objective   Physical Exam  Constitutional:       General: He is not in acute distress.     Appearance: He is not ill-appearing.   Cardiovascular:      Rate and Rhythm: Regular rhythm.   Pulmonary:      Effort: Pulmonary effort is normal.      Breath sounds: Normal breath sounds.   Skin:     General: Skin is warm and dry.      Comments: Left lower buttocks with erythema and warmth noted to firm raised abscess approximately 1.5 cm in size.  No drainage at this time.     Neurological:      Mental Status: He is alert.         No current outpatient medications on file.     Assessment/Plan   Problem List Items Addressed This Visit          Musculoskeletal    Abscess of buttock, left - Primary       Endocrine/Metabolic    Type 2 diabetes mellitus without complication, with long-term current use of insulin (CMS/[facility])   Abscess of buttock;  Will start him on Keflex 500mg bid x 10 days.  Keep area clean and dry;  continue to monitor.    DMII;  Hgba1c was 7.9.  Will increase his Lantus to 23 units at hs.  Continue to monitor BS.

## 2023-04-19 ENCOUNTER — NURSING HOME VISIT (OUTPATIENT)
Dept: POST ACUTE CARE | Facility: EXTERNAL LOCATION | Age: 78
End: 2023-04-19
Payer: MEDICARE

## 2023-04-19 DIAGNOSIS — G30.1 SEVERE LATE ONSET ALZHEIMER'S DEMENTIA WITHOUT BEHAVIORAL DISTURBANCE, PSYCHOTIC DISTURBANCE, MOOD DISTURBANCE, OR ANXIETY (MULTI): Primary | ICD-10-CM

## 2023-04-19 DIAGNOSIS — G20.A1 PARKINSON DISEASE (MULTI): ICD-10-CM

## 2023-04-19 DIAGNOSIS — F02.C0 SEVERE LATE ONSET ALZHEIMER'S DEMENTIA WITHOUT BEHAVIORAL DISTURBANCE, PSYCHOTIC DISTURBANCE, MOOD DISTURBANCE, OR ANXIETY (MULTI): Primary | ICD-10-CM

## 2023-04-19 PROCEDURE — 99308 SBSQ NF CARE LOW MDM 20: CPT | Performed by: INTERNAL MEDICINE

## 2023-04-19 NOTE — LETTER
Patient: Derik Meza  : 1945    Encounter Date: 2023    Pt was seen in the NH,Pt is doing fine , no complaint  General appearance: Comfortable, no distress  ROS: No SOB  Medications reviewed  Head: Normal  Neck: Soft  Heart: Regular  Lungs: Clear  Abdomen: soft    Impression: clinically doing fine, continue current management    Problem List Items Addressed This Visit          Nervous    Severe dementia without behavioral disturbance, psychotic disturbance, mood disturbance, or anxiety (CMS/[facility]) - Primary    Parkinson disease (CMS/[facility])          Electronically Signed By: Oscar Noel MD   23  4:31 PM

## 2023-04-19 NOTE — PROGRESS NOTES
Pt was seen in the NH,Pt is doing fine , no complaint  General appearance: Comfortable, no distress  ROS: No SOB  Medications reviewed  Head: Normal  Neck: Soft  Heart: Regular  Lungs: Clear  Abdomen: soft    Impression: clinically doing fine, continue current management    Problem List Items Addressed This Visit          Nervous    Severe dementia without behavioral disturbance, psychotic disturbance, mood disturbance, or anxiety (CMS/HCC) - Primary    Parkinson disease (CMS/HCC)

## 2023-05-22 ENCOUNTER — NURSING HOME VISIT (OUTPATIENT)
Dept: POST ACUTE CARE | Facility: EXTERNAL LOCATION | Age: 78
End: 2023-05-22
Payer: MEDICARE

## 2023-05-22 DIAGNOSIS — F02.C0 SEVERE LATE ONSET ALZHEIMER'S DEMENTIA WITHOUT BEHAVIORAL DISTURBANCE, PSYCHOTIC DISTURBANCE, MOOD DISTURBANCE, OR ANXIETY (MULTI): Primary | ICD-10-CM

## 2023-05-22 DIAGNOSIS — G20.A1 PARKINSON DISEASE (MULTI): ICD-10-CM

## 2023-05-22 DIAGNOSIS — G30.1 SEVERE LATE ONSET ALZHEIMER'S DEMENTIA WITHOUT BEHAVIORAL DISTURBANCE, PSYCHOTIC DISTURBANCE, MOOD DISTURBANCE, OR ANXIETY (MULTI): Primary | ICD-10-CM

## 2023-05-22 PROCEDURE — 99308 SBSQ NF CARE LOW MDM 20: CPT | Performed by: INTERNAL MEDICINE

## 2023-05-22 NOTE — LETTER
Patient: Derik Meza  : 1945    Encounter Date: 2023    Pt was seen in the NH,Pt is doing fine , no complaint  General appearance: Comfortable, no distress  ROS: No SOB  Medications reviewed  Head: Normal  Neck: Soft  Heart: Regular  Lungs: Clear  Abdomen: soft    Impression: clinically doing fine, continue current management    Problem List Items Addressed This Visit          Nervous    Severe dementia without behavioral disturbance, psychotic disturbance, mood disturbance, or anxiety (CMS/[facility]) - Primary    Parkinson disease (CMS/[facility])          Electronically Signed By: Oscar Noel MD   23  4:56 PM

## 2023-06-22 ENCOUNTER — NURSING HOME VISIT (OUTPATIENT)
Dept: POST ACUTE CARE | Facility: EXTERNAL LOCATION | Age: 78
End: 2023-06-22
Payer: MEDICARE

## 2023-06-22 DIAGNOSIS — G30.1 SEVERE LATE ONSET ALZHEIMER'S DEMENTIA WITHOUT BEHAVIORAL DISTURBANCE, PSYCHOTIC DISTURBANCE, MOOD DISTURBANCE, OR ANXIETY (MULTI): Primary | ICD-10-CM

## 2023-06-22 DIAGNOSIS — F02.C0 SEVERE LATE ONSET ALZHEIMER'S DEMENTIA WITHOUT BEHAVIORAL DISTURBANCE, PSYCHOTIC DISTURBANCE, MOOD DISTURBANCE, OR ANXIETY (MULTI): Primary | ICD-10-CM

## 2023-06-22 DIAGNOSIS — G20.A1 PARKINSON DISEASE (MULTI): ICD-10-CM

## 2023-06-22 PROCEDURE — 99308 SBSQ NF CARE LOW MDM 20: CPT | Performed by: INTERNAL MEDICINE

## 2023-06-22 NOTE — PROGRESS NOTES
Pt was seen in the NH,Pt is in his usual state , no complaint  General appearance: Comfortable, no distress  ROS: No SOB  Medications reviewed  Head: Normal  Neck: Soft  Heart: Regular  Lungs: Clear  Abdomen: soft    Impression: clinically doing fine, continue current management    Problem List Items Addressed This Visit       Severe dementia without behavioral disturbance, psychotic disturbance, mood disturbance, or anxiety (CMS/HCC) - Primary    Parkinson disease (CMS/HCC)

## 2023-06-22 NOTE — LETTER
Patient: Derik Meza  : 1945    Encounter Date: 2023    Pt was seen in the NH,Pt is in his usual state , no complaint  General appearance: Comfortable, no distress  ROS: No SOB  Medications reviewed  Head: Normal  Neck: Soft  Heart: Regular  Lungs: Clear  Abdomen: soft    Impression: clinically doing fine, continue current management    Problem List Items Addressed This Visit       Severe dementia without behavioral disturbance, psychotic disturbance, mood disturbance, or anxiety (CMS/HCC) - Primary    Parkinson disease (CMS/HCC)          Electronically Signed By: Oscar Noel MD   23  7:44 PM

## 2023-07-20 ENCOUNTER — NURSING HOME VISIT (OUTPATIENT)
Dept: POST ACUTE CARE | Facility: EXTERNAL LOCATION | Age: 78
End: 2023-07-20
Payer: MEDICARE

## 2023-07-20 DIAGNOSIS — F02.C0 SEVERE LATE ONSET ALZHEIMER'S DEMENTIA WITHOUT BEHAVIORAL DISTURBANCE, PSYCHOTIC DISTURBANCE, MOOD DISTURBANCE, OR ANXIETY (MULTI): Primary | ICD-10-CM

## 2023-07-20 DIAGNOSIS — G30.1 SEVERE LATE ONSET ALZHEIMER'S DEMENTIA WITHOUT BEHAVIORAL DISTURBANCE, PSYCHOTIC DISTURBANCE, MOOD DISTURBANCE, OR ANXIETY (MULTI): Primary | ICD-10-CM

## 2023-07-20 DIAGNOSIS — G20.A1 PARKINSON DISEASE (MULTI): ICD-10-CM

## 2023-07-20 PROCEDURE — 99308 SBSQ NF CARE LOW MDM 20: CPT | Performed by: INTERNAL MEDICINE

## 2023-07-20 NOTE — PROGRESS NOTES
Pt was seen in the NH,Pt is in usual state , no complaint  General appearance: Comfortable, no distress  ROS: No SOB  Medications reviewed  Head: Normal  Neck: Soft  Heart: Regular  Lungs: Clear  Abdomen: soft    Impression: clinically doing fine, continue current management    Problem List Items Addressed This Visit       Severe dementia without behavioral disturbance, psychotic disturbance, mood disturbance, or anxiety (CMS/HCC) - Primary    Parkinson disease (CMS/HCC)

## 2023-08-23 ENCOUNTER — NURSING HOME VISIT (OUTPATIENT)
Dept: POST ACUTE CARE | Facility: EXTERNAL LOCATION | Age: 78
End: 2023-08-23
Payer: MEDICARE

## 2023-08-23 DIAGNOSIS — F02.C0 SEVERE LATE ONSET ALZHEIMER'S DEMENTIA WITHOUT BEHAVIORAL DISTURBANCE, PSYCHOTIC DISTURBANCE, MOOD DISTURBANCE, OR ANXIETY (MULTI): Primary | ICD-10-CM

## 2023-08-23 DIAGNOSIS — G30.1 SEVERE LATE ONSET ALZHEIMER'S DEMENTIA WITHOUT BEHAVIORAL DISTURBANCE, PSYCHOTIC DISTURBANCE, MOOD DISTURBANCE, OR ANXIETY (MULTI): Primary | ICD-10-CM

## 2023-08-23 DIAGNOSIS — G20.A1 PARKINSON DISEASE (MULTI): ICD-10-CM

## 2023-08-23 PROCEDURE — 99308 SBSQ NF CARE LOW MDM 20: CPT | Performed by: INTERNAL MEDICINE

## 2023-08-23 NOTE — LETTER
Patient: Derik Meza  : 1945    Encounter Date: 2023    Pt was seen in the NH.  Pt is in usual state , no complaint  General appearance: Comfortable, no distress  ROS: No SOB  Medications reviewed  Head: Normal  Neck: Soft  Heart: Regular  Lungs: Clear  Abdomen: soft    Impression: clinically doing fine, continue current management    Problem List Items Addressed This Visit       Severe dementia without behavioral disturbance, psychotic disturbance, mood disturbance, or anxiety (CMS/HCC) - Primary    Parkinson disease (CMS/[facility])          Electronically Signed By: Oscar Noel MD   23  3:44 PM

## 2023-08-23 NOTE — PROGRESS NOTES
Pt was seen in the NH.  Pt is in usual state , no complaint  General appearance: Comfortable, no distress  ROS: No SOB  Medications reviewed  Head: Normal  Neck: Soft  Heart: Regular  Lungs: Clear  Abdomen: soft    Impression: clinically doing fine, continue current management    Problem List Items Addressed This Visit       Severe dementia without behavioral disturbance, psychotic disturbance, mood disturbance, or anxiety (CMS/HCC) - Primary    Parkinson disease (CMS/HCC)

## 2023-09-20 ENCOUNTER — NURSING HOME VISIT (OUTPATIENT)
Dept: POST ACUTE CARE | Facility: EXTERNAL LOCATION | Age: 78
End: 2023-09-20
Payer: MEDICARE

## 2023-09-20 DIAGNOSIS — G30.1 SEVERE LATE ONSET ALZHEIMER'S DEMENTIA WITHOUT BEHAVIORAL DISTURBANCE, PSYCHOTIC DISTURBANCE, MOOD DISTURBANCE, OR ANXIETY (MULTI): Primary | ICD-10-CM

## 2023-09-20 DIAGNOSIS — F02.C0 SEVERE LATE ONSET ALZHEIMER'S DEMENTIA WITHOUT BEHAVIORAL DISTURBANCE, PSYCHOTIC DISTURBANCE, MOOD DISTURBANCE, OR ANXIETY (MULTI): Primary | ICD-10-CM

## 2023-09-20 DIAGNOSIS — G20.A1 PARKINSON DISEASE (MULTI): ICD-10-CM

## 2023-09-20 DIAGNOSIS — I10 HYPERTENSION, UNSPECIFIED TYPE: ICD-10-CM

## 2023-09-20 PROCEDURE — 99308 SBSQ NF CARE LOW MDM 20: CPT | Performed by: INTERNAL MEDICINE

## 2023-09-20 NOTE — LETTER
Patient: Derik Meza  : 1945    Encounter Date: 2023    Pt was seen in the NH.  Pt is in usual state , no complaint  General appearance: Comfortable, no distress  ROS: No SOB  Medications reviewed  Head: Normal  Neck: Soft  Heart: Regular  Lungs: Clear  Abdomen: soft    Impression: clinically doing fine, continue current management    Problem List Items Addressed This Visit       Severe dementia without behavioral disturbance, psychotic disturbance, mood disturbance, or anxiety (CMS/HCC) - Primary    Hypertension    Parkinson disease (CMS/HCC)          Electronically Signed By: Oscar Noel MD   23  4:51 PM

## 2023-09-20 NOTE — PROGRESS NOTES
Pt was seen in the NH.  Pt is in usual state , no complaint  General appearance: Comfortable, no distress  ROS: No SOB  Medications reviewed  Head: Normal  Neck: Soft  Heart: Regular  Lungs: Clear  Abdomen: soft    Impression: clinically doing fine, continue current management    Problem List Items Addressed This Visit       Severe dementia without behavioral disturbance, psychotic disturbance, mood disturbance, or anxiety (CMS/HCC) - Primary    Hypertension    Parkinson disease (CMS/HCC)

## 2023-10-03 ENCOUNTER — NURSING HOME VISIT (OUTPATIENT)
Dept: POST ACUTE CARE | Facility: EXTERNAL LOCATION | Age: 78
End: 2023-10-03
Payer: MEDICARE

## 2023-10-03 DIAGNOSIS — Z79.4 TYPE 2 DIABETES MELLITUS WITHOUT COMPLICATION, WITH LONG-TERM CURRENT USE OF INSULIN (MULTI): Primary | ICD-10-CM

## 2023-10-03 DIAGNOSIS — E11.9 TYPE 2 DIABETES MELLITUS WITHOUT COMPLICATION, WITH LONG-TERM CURRENT USE OF INSULIN (MULTI): Primary | ICD-10-CM

## 2023-10-03 PROCEDURE — 99308 SBSQ NF CARE LOW MDM 20: CPT | Performed by: NURSE PRACTITIONER

## 2023-10-03 ASSESSMENT — ENCOUNTER SYMPTOMS
RESPIRATORY NEGATIVE: 1
APPETITE CHANGE: 0
ENDOCRINE NEGATIVE: 1
GASTROINTESTINAL NEGATIVE: 1
FEVER: 0
CARDIOVASCULAR NEGATIVE: 1

## 2023-10-03 NOTE — LETTER
Patient: Derik Meza  : 1945    Encounter Date: 10/03/2023    Subjective  Patient ID: Derik Meza is a 78 y.o. male who presents for No chief complaint on file..  77 yo male at [facility] on [address] with history of DM.  Staff reports resident is having BS readings 4-5 x per day.  Resident c/o discomfort in fingers.  Labs, BS, medications reviewed with staff nursing.          Review of Systems   Constitutional:  Negative for appetite change and fever.   Respiratory: Negative.     Cardiovascular: Negative.    Gastrointestinal: Negative.    Endocrine: Negative.        Objective  Physical Exam  Constitutional:       General: He is not in acute distress.  Cardiovascular:      Rate and Rhythm: Normal rate and regular rhythm.   Pulmonary:      Effort: Pulmonary effort is normal.      Breath sounds: Normal breath sounds.   Skin:     General: Skin is warm and dry.   Neurological:      Mental Status: He is alert.         No current outpatient medications on file.     Assessment/Plan  Problem List Items Addressed This Visit          Endocrine/Metabolic    Type 2 diabetes mellitus without complication, with long-term current use of insulin (CMS/[facility]) - Primary   Discontinue sliding scale and BS readings 5 x per day.  Check BS readings twice per week (one fasting and one PP).  Continue on Trulicity and increased Lantus from 32 units to 35 units.  Get Hgba1c tomorrow and every 3 months.  Continue to monitor.             Electronically Signed By: LOLY Chambers   10/3/23  3:11 PM

## 2023-10-03 NOTE — PROGRESS NOTES
Subjective   Patient ID: Derik Meza is a 78 y.o. male who presents for No chief complaint on file..  77 yo male at [facility] on [address] with history of DM.  Staff reports resident is having BS readings 4-5 x per day.  Resident c/o discomfort in fingers.  Labs, BS, medications reviewed with staff nursing.          Review of Systems   Constitutional:  Negative for appetite change and fever.   Respiratory: Negative.     Cardiovascular: Negative.    Gastrointestinal: Negative.    Endocrine: Negative.        Objective   Physical Exam  Constitutional:       General: He is not in acute distress.  Cardiovascular:      Rate and Rhythm: Normal rate and regular rhythm.   Pulmonary:      Effort: Pulmonary effort is normal.      Breath sounds: Normal breath sounds.   Skin:     General: Skin is warm and dry.   Neurological:      Mental Status: He is alert.         No current outpatient medications on file.     Assessment/Plan   Problem List Items Addressed This Visit          Endocrine/Metabolic    Type 2 diabetes mellitus without complication, with long-term current use of insulin (CMS/[facility]) - Primary   Discontinue sliding scale and BS readings 5 x per day.  Check BS readings twice per week (one fasting and one PP).  Continue on Trulicity and increased Lantus from 32 units to 35 units.  Get Hgba1c tomorrow and every 3 months.  Continue to monitor.

## 2023-10-23 ENCOUNTER — NURSING HOME VISIT (OUTPATIENT)
Dept: POST ACUTE CARE | Facility: EXTERNAL LOCATION | Age: 78
End: 2023-10-23
Payer: MEDICARE

## 2023-10-23 DIAGNOSIS — G20.A1 PARKINSON'S DISEASE WITHOUT DYSKINESIA OR FLUCTUATING MANIFESTATIONS (MULTI): ICD-10-CM

## 2023-10-23 DIAGNOSIS — F02.C0 SEVERE LATE ONSET ALZHEIMER'S DEMENTIA WITHOUT BEHAVIORAL DISTURBANCE, PSYCHOTIC DISTURBANCE, MOOD DISTURBANCE, OR ANXIETY (MULTI): Primary | ICD-10-CM

## 2023-10-23 DIAGNOSIS — G30.1 SEVERE LATE ONSET ALZHEIMER'S DEMENTIA WITHOUT BEHAVIORAL DISTURBANCE, PSYCHOTIC DISTURBANCE, MOOD DISTURBANCE, OR ANXIETY (MULTI): Primary | ICD-10-CM

## 2023-10-23 PROCEDURE — 99308 SBSQ NF CARE LOW MDM 20: CPT | Performed by: INTERNAL MEDICINE

## 2023-10-23 NOTE — LETTER
Patient: Derik Meza  : 1945    Encounter Date: 10/23/2023    Pt was seen in the NH.  Pt is in usual state , no complaint  General appearance: Comfortable, no distress  ROS: No SOB  Medications reviewed  Head: Normal  Neck: Soft  Heart: Regular  Lungs: Clear  Abdomen: soft    Impression: clinically doing fine, continue current management    Problem List Items Addressed This Visit       Severe dementia without behavioral disturbance, psychotic disturbance, mood disturbance, or anxiety (CMS/[facility]) - Primary    Parkinson disease          Electronically Signed By: Oscar Noel MD   10/23/23  5:23 PM

## 2023-10-23 NOTE — PROGRESS NOTES
Pt was seen in the NH.  Pt is in usual state , no complaint  General appearance: Comfortable, no distress  ROS: No SOB  Medications reviewed  Head: Normal  Neck: Soft  Heart: Regular  Lungs: Clear  Abdomen: soft    Impression: clinically doing fine, continue current management    Problem List Items Addressed This Visit       Severe dementia without behavioral disturbance, psychotic disturbance, mood disturbance, or anxiety (CMS/HCC) - Primary    Parkinson disease

## 2023-11-17 ENCOUNTER — NURSING HOME VISIT (OUTPATIENT)
Dept: POST ACUTE CARE | Facility: EXTERNAL LOCATION | Age: 78
End: 2023-11-17
Payer: MEDICAID

## 2023-11-17 DIAGNOSIS — G20.A1 PARKINSON'S DISEASE, UNSPECIFIED WHETHER DYSKINESIA PRESENT, UNSPECIFIED WHETHER MANIFESTATIONS FLUCTUATE (MULTI): Primary | ICD-10-CM

## 2023-11-17 DIAGNOSIS — I10 HYPERTENSION, UNSPECIFIED TYPE: ICD-10-CM

## 2023-11-17 PROCEDURE — 99308 SBSQ NF CARE LOW MDM 20: CPT | Performed by: INTERNAL MEDICINE

## 2023-11-17 NOTE — LETTER
Patient: Derik Meza  : 1945    Encounter Date: 2023    Pt was seen in the NH.  Pt is in usual state , no complaint  General appearance: Comfortable, no distress  ROS: No SOB  Medications reviewed  Head: Normal  Neck: Soft  Heart: Regular  Lungs: Clear  Abdomen: soft    Impression: clinically doing fine, continue current management    Problem List Items Addressed This Visit       Hypertension    Parkinson disease - Primary          Electronically Signed By: Oscar Noel MD   23  8:27 PM

## 2023-11-18 NOTE — PROGRESS NOTES
Pt was seen in the NH.  Pt is in usual state , no complaint  General appearance: Comfortable, no distress  ROS: No SOB  Medications reviewed  Head: Normal  Neck: Soft  Heart: Regular  Lungs: Clear  Abdomen: soft    Impression: clinically doing fine, continue current management    Problem List Items Addressed This Visit       Hypertension    Parkinson disease - Primary

## 2023-12-06 ENCOUNTER — NURSING HOME VISIT (OUTPATIENT)
Dept: POST ACUTE CARE | Facility: EXTERNAL LOCATION | Age: 78
End: 2023-12-06
Payer: MEDICAID

## 2023-12-06 DIAGNOSIS — G20.A1 PARKINSON'S DISEASE WITHOUT DYSKINESIA OR FLUCTUATING MANIFESTATIONS (MULTI): ICD-10-CM

## 2023-12-06 DIAGNOSIS — F02.C0 SEVERE LATE ONSET ALZHEIMER'S DEMENTIA WITHOUT BEHAVIORAL DISTURBANCE, PSYCHOTIC DISTURBANCE, MOOD DISTURBANCE, OR ANXIETY (MULTI): Primary | ICD-10-CM

## 2023-12-06 DIAGNOSIS — G30.1 SEVERE LATE ONSET ALZHEIMER'S DEMENTIA WITHOUT BEHAVIORAL DISTURBANCE, PSYCHOTIC DISTURBANCE, MOOD DISTURBANCE, OR ANXIETY (MULTI): Primary | ICD-10-CM

## 2023-12-06 PROCEDURE — 99308 SBSQ NF CARE LOW MDM 20: CPT | Performed by: INTERNAL MEDICINE

## 2023-12-06 NOTE — LETTER
Patient: Derik Meza  : 1945    Encounter Date: 2023    Pt was seen in the NH.  Pt is in usual state , no complaint  General appearance: Comfortable, no distress  ROS: No SOB  Medications reviewed  Head: Normal  Neck: Soft  Heart: Regular  Lungs: Clear  Abdomen: soft    Impression: clinically doing fine, continue current management    Problem List Items Addressed This Visit       Severe dementia without behavioral disturbance, psychotic disturbance, mood disturbance, or anxiety (CMS/[facility]) - Primary    Parkinson disease          Electronically Signed By: Oscar Noel MD   23  4:19 PM   None known

## 2024-01-23 ENCOUNTER — NURSING HOME VISIT (OUTPATIENT)
Dept: POST ACUTE CARE | Facility: EXTERNAL LOCATION | Age: 79
End: 2024-01-23
Payer: MEDICAID

## 2024-01-23 DIAGNOSIS — G20.A1 PARKINSON'S DISEASE WITHOUT DYSKINESIA OR FLUCTUATING MANIFESTATIONS (MULTI): ICD-10-CM

## 2024-01-23 DIAGNOSIS — G30.1 SEVERE LATE ONSET ALZHEIMER'S DEMENTIA WITHOUT BEHAVIORAL DISTURBANCE, PSYCHOTIC DISTURBANCE, MOOD DISTURBANCE, OR ANXIETY (MULTI): Primary | ICD-10-CM

## 2024-01-23 DIAGNOSIS — F02.C0 SEVERE LATE ONSET ALZHEIMER'S DEMENTIA WITHOUT BEHAVIORAL DISTURBANCE, PSYCHOTIC DISTURBANCE, MOOD DISTURBANCE, OR ANXIETY (MULTI): Primary | ICD-10-CM

## 2024-01-23 DIAGNOSIS — I10 HYPERTENSION, UNSPECIFIED TYPE: ICD-10-CM

## 2024-01-23 DIAGNOSIS — E11.9 TYPE 2 DIABETES MELLITUS WITHOUT COMPLICATION, WITH LONG-TERM CURRENT USE OF INSULIN (MULTI): ICD-10-CM

## 2024-01-23 DIAGNOSIS — Z79.4 TYPE 2 DIABETES MELLITUS WITHOUT COMPLICATION, WITH LONG-TERM CURRENT USE OF INSULIN (MULTI): ICD-10-CM

## 2024-01-23 PROCEDURE — 99308 SBSQ NF CARE LOW MDM 20: CPT | Performed by: INTERNAL MEDICINE

## 2024-01-23 NOTE — LETTER
Patient: Derik Meza  : 1945    Encounter Date: 2024    Pt was seen in the NH.  Pt is in usual state , no complaint  General appearance: Comfortable, no distress  ROS: No SOB  Medications reviewed  Head: Normal  Neck: Soft  Heart: Regular  Lungs: Clear  Abdomen: soft    Impression: clinically doing fine, continue current management    Problem List Items Addressed This Visit       Severe dementia without behavioral disturbance, psychotic disturbance, mood disturbance, or anxiety (CMS/HCC) - Primary    Hypertension    Type 2 diabetes mellitus without complication, with long-term current use of insulin (CMS/HCC)    Parkinson's disease without dyskinesia or fluctuating manifestations          Electronically Signed By: Oscar Neol MD   24  4:40 PM

## 2024-01-23 NOTE — PROGRESS NOTES
Pt was seen in the NH.  Pt is in usual state , no complaint  General appearance: Comfortable, no distress  ROS: No SOB  Medications reviewed  Head: Normal  Neck: Soft  Heart: Regular  Lungs: Clear  Abdomen: soft    Impression: clinically doing fine, continue current management    Problem List Items Addressed This Visit       Severe dementia without behavioral disturbance, psychotic disturbance, mood disturbance, or anxiety (CMS/HCC) - Primary    Hypertension    Type 2 diabetes mellitus without complication, with long-term current use of insulin (CMS/HCC)    Parkinson's disease without dyskinesia or fluctuating manifestations

## 2024-02-13 ENCOUNTER — NURSING HOME VISIT (OUTPATIENT)
Dept: POST ACUTE CARE | Facility: EXTERNAL LOCATION | Age: 79
End: 2024-02-13
Payer: COMMERCIAL

## 2024-02-13 DIAGNOSIS — G20.A1 PARKINSON'S DISEASE WITHOUT DYSKINESIA OR FLUCTUATING MANIFESTATIONS (MULTI): Primary | ICD-10-CM

## 2024-02-13 PROCEDURE — 99308 SBSQ NF CARE LOW MDM 20: CPT | Performed by: NURSE PRACTITIONER

## 2024-02-13 NOTE — LETTER
Patient: Derik Meza  : 1945    Encounter Date: 2024    Subjective  Patient ID: Derik Meza is a 79 y.o. male who presents for No chief complaint on file..  78 yo male resident at [facility] with history of Parkinsons, dementia, HTN and DM.  Staff reports resident has noted an increase in tremors of hands the past couple of days.          Review of Systems   Constitutional:  Negative for appetite change and fever.   Respiratory: Negative.     Cardiovascular: Negative.    Gastrointestinal: Negative.    Neurological:  Positive for tremors.       Objective  Physical Exam  Constitutional:       General: He is not in acute distress.  Cardiovascular:      Rate and Rhythm: Normal rate and regular rhythm.   Pulmonary:      Effort: Pulmonary effort is normal.      Breath sounds: Normal breath sounds.   Skin:     General: Skin is warm and dry.   Neurological:      Mental Status: He is alert.      Motor: Weakness present.      Comments: No increase in tremors noted during rest.  Resident not cooperative in holding arms or hands up to evaluate tremors.  Resident non-cooperative in hand grasping.  No head tremors noted at this time.           No current outpatient medications on file.     Assessment/Plan  Problem List Items Addressed This Visit          Neuro    Parkinson's disease without dyskinesia or fluctuating manifestations - Primary   Will get labs tomorrow and continue to monitor need for treatment.  Will have physical therapy evaluate.             Electronically Signed By: LOLY Chambers   24  2:03 PM

## 2024-02-16 ASSESSMENT — ENCOUNTER SYMPTOMS
FEVER: 0
GASTROINTESTINAL NEGATIVE: 1
APPETITE CHANGE: 0
CARDIOVASCULAR NEGATIVE: 1
RESPIRATORY NEGATIVE: 1
TREMORS: 1

## 2024-02-16 NOTE — PROGRESS NOTES
Subjective   Patient ID: Derik Meza is a 79 y.o. male who presents for No chief complaint on file..  78 yo male resident at [facility] on [facility] with history of Parkinsons, dementia, HTN and DM.  Staff reports resident has noted an increase in tremors of hands the past couple of days.          Review of Systems   Constitutional:  Negative for appetite change and fever.   Respiratory: Negative.     Cardiovascular: Negative.    Gastrointestinal: Negative.    Neurological:  Positive for tremors.       Objective   Physical Exam  Constitutional:       General: He is not in acute distress.  Cardiovascular:      Rate and Rhythm: Normal rate and regular rhythm.   Pulmonary:      Effort: Pulmonary effort is normal.      Breath sounds: Normal breath sounds.   Skin:     General: Skin is warm and dry.   Neurological:      Mental Status: He is alert.      Motor: Weakness present.      Comments: No increase in tremors noted during rest.  Resident not cooperative in holding arms or hands up to evaluate tremors.  Resident non-cooperative in hand grasping.  No head tremors noted at this time.           No current outpatient medications on file.     Assessment/Plan   Problem List Items Addressed This Visit          Neuro    Parkinson's disease without dyskinesia or fluctuating manifestations - Primary   Will get labs tomorrow and continue to monitor need for treatment.  Will have physical therapy evaluate.

## 2024-02-21 ENCOUNTER — NURSING HOME VISIT (OUTPATIENT)
Dept: POST ACUTE CARE | Facility: EXTERNAL LOCATION | Age: 79
End: 2024-02-21
Payer: COMMERCIAL

## 2024-02-21 DIAGNOSIS — E11.9 TYPE 2 DIABETES MELLITUS WITHOUT COMPLICATION, WITH LONG-TERM CURRENT USE OF INSULIN (MULTI): ICD-10-CM

## 2024-02-21 DIAGNOSIS — Z79.4 TYPE 2 DIABETES MELLITUS WITHOUT COMPLICATION, WITH LONG-TERM CURRENT USE OF INSULIN (MULTI): ICD-10-CM

## 2024-02-21 DIAGNOSIS — F02.C0 SEVERE LATE ONSET ALZHEIMER'S DEMENTIA WITHOUT BEHAVIORAL DISTURBANCE, PSYCHOTIC DISTURBANCE, MOOD DISTURBANCE, OR ANXIETY (MULTI): Primary | ICD-10-CM

## 2024-02-21 DIAGNOSIS — G30.1 SEVERE LATE ONSET ALZHEIMER'S DEMENTIA WITHOUT BEHAVIORAL DISTURBANCE, PSYCHOTIC DISTURBANCE, MOOD DISTURBANCE, OR ANXIETY (MULTI): Primary | ICD-10-CM

## 2024-02-21 PROCEDURE — 99308 SBSQ NF CARE LOW MDM 20: CPT | Performed by: INTERNAL MEDICINE

## 2024-02-21 NOTE — PROGRESS NOTES
Pt was seen in the NH.  Pt is in usual state , no complaint  General appearance: Comfortable, no distress  ROS: No SOB  Medications reviewed  Head: Normal  Neck: Soft  Heart: Regular  Lungs: Clear  Abdomen: soft    Impression: clinically doing fine, continue current management    Problem List Items Addressed This Visit       Severe dementia without behavioral disturbance, psychotic disturbance, mood disturbance, or anxiety (CMS/HCC) - Primary    Type 2 diabetes mellitus without complication, with long-term current use of insulin (CMS/HCC)

## 2024-02-21 NOTE — LETTER
Patient: Derik Meza  : 1945    Encounter Date: 2024    Pt was seen in the NH.  Pt is in usual state , no complaint  General appearance: Comfortable, no distress  ROS: No SOB  Medications reviewed  Head: Normal  Neck: Soft  Heart: Regular  Lungs: Clear  Abdomen: soft    Impression: clinically doing fine, continue current management    Problem List Items Addressed This Visit       Severe dementia without behavioral disturbance, psychotic disturbance, mood disturbance, or anxiety (CMS/HCC) - Primary    Type 2 diabetes mellitus without complication, with long-term current use of insulin (CMS/HCC)          Electronically Signed By: Oscar Noel MD   24  4:19 PM

## 2024-03-20 ENCOUNTER — NURSING HOME VISIT (OUTPATIENT)
Dept: POST ACUTE CARE | Facility: EXTERNAL LOCATION | Age: 79
End: 2024-03-20
Payer: COMMERCIAL

## 2024-03-20 DIAGNOSIS — U07.1 COVID-19: Primary | ICD-10-CM

## 2024-03-20 PROCEDURE — 99308 SBSQ NF CARE LOW MDM 20: CPT | Performed by: INTERNAL MEDICINE

## 2024-03-20 NOTE — PROGRESS NOTES
Pt was seen in the NH.  Pt is in usual state , no complaint, recently dx with covid  General appearance: Comfortable, no distress  ROS: No SOB  Medications reviewed  Head: Normal  Neck: Soft  Heart: Regular  Lungs: Clear  Abdomen: soft    Impression: clinically doing fine, supportive care,continue current management    Problem List Items Addressed This Visit    None  Visit Diagnoses       COVID-19    -  Primary

## 2024-03-20 NOTE — LETTER
Patient: Derik Meza  : 1945    Encounter Date: 2024    Pt was seen in the NH.  Pt is in usual state , no complaint, recently dx with covid  General appearance: Comfortable, no distress  ROS: No SOB  Medications reviewed  Head: Normal  Neck: Soft  Heart: Regular  Lungs: Clear  Abdomen: soft    Impression: clinically doing fine, supportive care,continue current management    Problem List Items Addressed This Visit    None  Visit Diagnoses       COVID-19    -  Primary               Electronically Signed By: Oscar Noel MD   3/20/24  4:06 PM

## 2024-04-22 ENCOUNTER — NURSING HOME VISIT (OUTPATIENT)
Dept: POST ACUTE CARE | Facility: EXTERNAL LOCATION | Age: 79
End: 2024-04-22
Payer: COMMERCIAL

## 2024-04-22 DIAGNOSIS — G30.1 SEVERE LATE ONSET ALZHEIMER'S DEMENTIA WITHOUT BEHAVIORAL DISTURBANCE, PSYCHOTIC DISTURBANCE, MOOD DISTURBANCE, OR ANXIETY (MULTI): Primary | ICD-10-CM

## 2024-04-22 DIAGNOSIS — F02.C0 SEVERE LATE ONSET ALZHEIMER'S DEMENTIA WITHOUT BEHAVIORAL DISTURBANCE, PSYCHOTIC DISTURBANCE, MOOD DISTURBANCE, OR ANXIETY (MULTI): Primary | ICD-10-CM

## 2024-04-22 PROCEDURE — 99308 SBSQ NF CARE LOW MDM 20: CPT | Performed by: INTERNAL MEDICINE

## 2024-04-22 NOTE — PROGRESS NOTES
Pt was seen in the NH.  Pt is in usual state , no complaint  General appearance: Comfortable, no distress  ROS: No SOB  Medications reviewed  Head: Normal  Neck: Soft  Heart: Regular  Lungs: Clear  Abdomen: soft    Plan:   1)clinically doing fine  2) To continue aricept 10 mg daily    Problem List Items Addressed This Visit       Severe dementia without behavioral disturbance, psychotic disturbance, mood disturbance, or anxiety (Multi) - Primary

## 2024-04-22 NOTE — LETTER
Patient: Derik Meza  : 1945    Encounter Date: 2024    Pt was seen in the NH.  Pt is in usual state , no complaint  General appearance: Comfortable, no distress  ROS: No SOB  Medications reviewed  Head: Normal  Neck: Soft  Heart: Regular  Lungs: Clear  Abdomen: soft    Plan:   1)clinically doing fine  2) To continue aricept 10 mg daily    Problem List Items Addressed This Visit       Severe dementia without behavioral disturbance, psychotic disturbance, mood disturbance, or anxiety (Multi) - Primary          Electronically Signed By: Oscar Noel MD   24  5:11 PM

## 2024-05-22 ENCOUNTER — NURSING HOME VISIT (OUTPATIENT)
Dept: POST ACUTE CARE | Facility: EXTERNAL LOCATION | Age: 79
End: 2024-05-22
Payer: COMMERCIAL

## 2024-05-22 DIAGNOSIS — Z79.4 TYPE 2 DIABETES MELLITUS WITHOUT COMPLICATION, WITH LONG-TERM CURRENT USE OF INSULIN (MULTI): Primary | ICD-10-CM

## 2024-05-22 DIAGNOSIS — E11.9 TYPE 2 DIABETES MELLITUS WITHOUT COMPLICATION, WITH LONG-TERM CURRENT USE OF INSULIN (MULTI): Primary | ICD-10-CM

## 2024-05-22 PROCEDURE — 99308 SBSQ NF CARE LOW MDM 20: CPT | Performed by: INTERNAL MEDICINE

## 2024-05-22 NOTE — LETTER
Patient: Derik Meza  : 1945    Encounter Date: 2024    Pt was seen in the NH.  Pt is in usual state , no complaint  General appearance: Comfortable, no distress  ROS: No SOB  Medications reviewed  Head: Normal  Neck: Soft  Heart: Regular  Lungs: Clear  Abdomen: soft    Plan:   1)clinically doing fine  2) To continue Lantus 45 units hs    Problem List Items Addressed This Visit       Type 2 diabetes mellitus without complication, with long-term current use of insulin (Multi) - Primary          Electronically Signed By: Oscar Noel MD   24  6:17 PM

## 2024-05-22 NOTE — PROGRESS NOTES
Pt was seen in the NH.  Pt is in usual state , no complaint  General appearance: Comfortable, no distress  ROS: No SOB  Medications reviewed  Head: Normal  Neck: Soft  Heart: Regular  Lungs: Clear  Abdomen: soft    Plan:   1)clinically doing fine  2) To continue Lantus 45 units hs    Problem List Items Addressed This Visit       Type 2 diabetes mellitus without complication, with long-term current use of insulin (Multi) - Primary

## 2024-06-19 ENCOUNTER — NURSING HOME VISIT (OUTPATIENT)
Dept: POST ACUTE CARE | Facility: EXTERNAL LOCATION | Age: 79
End: 2024-06-19

## 2024-06-19 DIAGNOSIS — F41.9 ANXIETY: Primary | ICD-10-CM

## 2024-06-19 NOTE — PROGRESS NOTES
Pt was seen in the NH.  Pt is in usual state , no complaint  General appearance: Comfortable, no distress  ROS: No SOB  Medications reviewed  Head: Normal  Neck: Soft  Heart: Regular  Lungs: Clear  Abdomen: soft    Plan:   1)clinically doing fine  2) To continue 0.5 mg q 12 hr prn    Problem List Items Addressed This Visit       Anxiety - Primary

## 2024-06-19 NOTE — LETTER
Patient: Derik Meza  : 1945    Encounter Date: 2024    Pt was seen in the NH.  Pt is in usual state , no complaint  General appearance: Comfortable, no distress  ROS: No SOB  Medications reviewed  Head: Normal  Neck: Soft  Heart: Regular  Lungs: Clear  Abdomen: soft    Plan:   1)clinically doing fine  2) To continue 0.5 mg q 12 hr prn    Problem List Items Addressed This Visit       Anxiety - Primary          Electronically Signed By: Oscar Noel MD   24  4:20 PM

## 2024-07-23 ENCOUNTER — NURSING HOME VISIT (OUTPATIENT)
Dept: POST ACUTE CARE | Facility: EXTERNAL LOCATION | Age: 79
End: 2024-07-23
Payer: COMMERCIAL

## 2024-07-23 DIAGNOSIS — E78.00 HYPERCHOLESTEROLEMIA: Primary | ICD-10-CM

## 2024-07-23 PROCEDURE — 99308 SBSQ NF CARE LOW MDM 20: CPT | Performed by: INTERNAL MEDICINE

## 2024-07-23 NOTE — LETTER
Patient: Derik Meza  : 1945    Encounter Date: 2024    Pt was seen in the NH.  Pt is in usual state , no complaint  General appearance: Comfortable, no distress  ROS: No SOB  Medications reviewed  Head: Normal  Neck: Soft  Heart: Regular  Lungs: Clear  Abdomen: soft    Plan:   1)clinically doing fine  2) To continue crestor 20 mg daily    Problem List Items Addressed This Visit    None  Visit Diagnoses       Hypercholesterolemia    -  Primary               Electronically Signed By: Oscar Noel MD   24  4:48 PM

## 2024-07-23 NOTE — PROGRESS NOTES
Pt was seen in the NH.  Pt is in usual state , no complaint  General appearance: Comfortable, no distress  ROS: No SOB  Medications reviewed  Head: Normal  Neck: Soft  Heart: Regular  Lungs: Clear  Abdomen: soft    Plan:   1)clinically doing fine  2) To continue crestor 20 mg daily    Problem List Items Addressed This Visit    None  Visit Diagnoses       Hypercholesterolemia    -  Primary

## 2024-08-29 ENCOUNTER — NURSING HOME VISIT (OUTPATIENT)
Dept: POST ACUTE CARE | Facility: EXTERNAL LOCATION | Age: 79
End: 2024-08-29
Payer: COMMERCIAL

## 2024-08-29 DIAGNOSIS — Z79.4 TYPE 2 DIABETES MELLITUS WITHOUT COMPLICATION, WITH LONG-TERM CURRENT USE OF INSULIN (MULTI): Primary | ICD-10-CM

## 2024-08-29 DIAGNOSIS — E11.9 TYPE 2 DIABETES MELLITUS WITHOUT COMPLICATION, WITH LONG-TERM CURRENT USE OF INSULIN (MULTI): Primary | ICD-10-CM

## 2024-08-29 PROCEDURE — 99308 SBSQ NF CARE LOW MDM 20: CPT | Performed by: INTERNAL MEDICINE

## 2024-08-29 NOTE — LETTER
Patient: Derik Meza  : 1945    Encounter Date: 2024    Pt was seen in the NH.  Pt is in usual state , no complaint  General appearance: Comfortable, no distress  ROS: No SOB  Medications reviewed  Head: Normal  Neck: Soft  Heart: Regular  Lungs: Clear  Abdomen: soft    Plan:   1)clinically doing fine  2) To continue lantus 48 units daily    Problem List Items Addressed This Visit       Type 2 diabetes mellitus without complication, with long-term current use of insulin (Multi) - Primary          Electronically Signed By: Oscar Noel MD   24  7:50 PM

## 2024-08-29 NOTE — PROGRESS NOTES
Pt was seen in the NH.  Pt is in usual state , no complaint  General appearance: Comfortable, no distress  ROS: No SOB  Medications reviewed  Head: Normal  Neck: Soft  Heart: Regular  Lungs: Clear  Abdomen: soft    Plan:   1)clinically doing fine  2) To continue lantus 48 units daily    Problem List Items Addressed This Visit       Type 2 diabetes mellitus without complication, with long-term current use of insulin (Multi) - Primary

## 2024-09-19 ENCOUNTER — NURSING HOME VISIT (OUTPATIENT)
Dept: POST ACUTE CARE | Facility: EXTERNAL LOCATION | Age: 79
End: 2024-09-19
Payer: COMMERCIAL

## 2024-09-19 DIAGNOSIS — G30.1 SEVERE LATE ONSET ALZHEIMER'S DEMENTIA WITHOUT BEHAVIORAL DISTURBANCE, PSYCHOTIC DISTURBANCE, MOOD DISTURBANCE, OR ANXIETY (MULTI): Primary | ICD-10-CM

## 2024-09-19 DIAGNOSIS — F02.C0 SEVERE LATE ONSET ALZHEIMER'S DEMENTIA WITHOUT BEHAVIORAL DISTURBANCE, PSYCHOTIC DISTURBANCE, MOOD DISTURBANCE, OR ANXIETY (MULTI): Primary | ICD-10-CM

## 2024-09-19 PROCEDURE — 99308 SBSQ NF CARE LOW MDM 20: CPT | Performed by: INTERNAL MEDICINE

## 2024-09-19 NOTE — LETTER
Patient: Derik Meza  : 1945    Encounter Date: 2024    Pt was seen in the NH.  Pt is in usual state , no complaint  General appearance: Comfortable, no distress  ROS: No SOB  Medications reviewed  Head: Normal  Neck: Soft  Heart: Regular  Lungs: Clear  Abdomen: soft    Plan:   1)clinically doing fine  2) To continue aricept 10 mg daily    Problem List Items Addressed This Visit       Severe dementia without behavioral disturbance, psychotic disturbance, mood disturbance, or anxiety (Multi) - Primary          Electronically Signed By: Oscar Noel MD   24  9:54 PM

## 2024-09-30 ENCOUNTER — NURSING HOME VISIT (OUTPATIENT)
Dept: POST ACUTE CARE | Facility: EXTERNAL LOCATION | Age: 79
End: 2024-09-30
Payer: COMMERCIAL

## 2024-09-30 DIAGNOSIS — R53.83 FATIGUE, UNSPECIFIED TYPE: ICD-10-CM

## 2024-09-30 DIAGNOSIS — F02.C0 SEVERE LATE ONSET ALZHEIMER'S DEMENTIA WITHOUT BEHAVIORAL DISTURBANCE, PSYCHOTIC DISTURBANCE, MOOD DISTURBANCE, OR ANXIETY (MULTI): Primary | ICD-10-CM

## 2024-09-30 DIAGNOSIS — G30.1 SEVERE LATE ONSET ALZHEIMER'S DEMENTIA WITHOUT BEHAVIORAL DISTURBANCE, PSYCHOTIC DISTURBANCE, MOOD DISTURBANCE, OR ANXIETY (MULTI): Primary | ICD-10-CM

## 2024-09-30 PROCEDURE — 99308 SBSQ NF CARE LOW MDM 20: CPT | Performed by: NURSE PRACTITIONER

## 2024-09-30 NOTE — LETTER
Patient: Derik Meza  : 1945    Encounter Date: 2024    Subjective  Patient ID: Derik Meza is a 79 y.o. male who presents for No chief complaint on file..  80 yo male resident at [facility] with history of dementia Alzheimers on [address] [facility].  Staff reports resident has become drowsy and inactive since placed on Trazodone for insomnia.  Resident is sleeping better however, seems to be more drowsy during the day.          Review of Systems   Constitutional:  Positive for fatigue. Negative for appetite change and fever.   HENT: Negative.     Respiratory: Negative.     Cardiovascular: Negative.    Gastrointestinal:  Negative for constipation, diarrhea, nausea and vomiting.   Psychiatric/Behavioral:  Positive for confusion.        Objective  Physical Exam  Constitutional:       Comments: Resident sitting in chair by nursing med room, resting with eyes closed.  Resident shakes head yes or no however, is not verbal.     Cardiovascular:      Rate and Rhythm: Normal rate and regular rhythm.   Pulmonary:      Breath sounds: Normal breath sounds. No wheezing, rhonchi or rales.   Abdominal:      General: Bowel sounds are normal.   Skin:     General: Skin is warm and dry.         No current outpatient medications on file.     Assessment/Plan  Problem List Items Addressed This Visit       Severe dementia without behavioral disturbance, psychotic disturbance, mood disturbance, or anxiety - Primary    Fatigue   Chart reviewed.  Recent labs stable.  Will decrease Trazodone to 25mg at hs.  Continue to monitor.             Electronically Signed By: LOLY Chambers   10/2/24  1:02 PM

## 2024-10-02 PROBLEM — R53.83 FATIGUE: Status: ACTIVE | Noted: 2024-10-02

## 2024-10-02 ASSESSMENT — ENCOUNTER SYMPTOMS
FATIGUE: 1
APPETITE CHANGE: 0
CONSTIPATION: 0
CONFUSION: 1
RESPIRATORY NEGATIVE: 1
VOMITING: 0
CARDIOVASCULAR NEGATIVE: 1
FEVER: 0
DIARRHEA: 0
NAUSEA: 0

## 2024-10-02 NOTE — PROGRESS NOTES
Subjective   Patient ID: Derik Meza is a 79 y.o. male who presents for No chief complaint on file..  80 yo male resident at [facility] with history of dementia Alzheimers on [address].  Staff reports resident has become drowsy and inactive since placed on Trazodone for insomnia.  Resident is sleeping better however, seems to be more drowsy during the day.          Review of Systems   Constitutional:  Positive for fatigue. Negative for appetite change and fever.   HENT: Negative.     Respiratory: Negative.     Cardiovascular: Negative.    Gastrointestinal:  Negative for constipation, diarrhea, nausea and vomiting.   Psychiatric/Behavioral:  Positive for confusion.        Objective   Physical Exam  Constitutional:       Comments: Resident sitting in chair by nursing med room, resting with eyes closed.  Resident shakes head yes or no however, is not verbal.     Cardiovascular:      Rate and Rhythm: Normal rate and regular rhythm.   Pulmonary:      Breath sounds: Normal breath sounds. No wheezing, rhonchi or rales.   Abdominal:      General: Bowel sounds are normal.   Skin:     General: Skin is warm and dry.         No current outpatient medications on file.     Assessment/Plan   Problem List Items Addressed This Visit       Severe dementia without behavioral disturbance, psychotic disturbance, mood disturbance, or anxiety - Primary    Fatigue   Chart reviewed.  Recent labs stable.  Will decrease Trazodone to 25mg at hs.  Continue to monitor.

## 2024-10-03 ENCOUNTER — NURSING HOME VISIT (OUTPATIENT)
Dept: POST ACUTE CARE | Facility: EXTERNAL LOCATION | Age: 79
End: 2024-10-03
Payer: COMMERCIAL
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
## 2024-10-03 DIAGNOSIS — M25.552 LEFT HIP PAIN: ICD-10-CM

## 2024-10-03 PROCEDURE — 99309 SBSQ NF CARE MODERATE MDM 30: CPT | Performed by: NURSE PRACTITIONER

## 2024-10-03 NOTE — LETTER
Patient: Derik Meza  : 1945    Encounter Date: 10/03/2024    Subjective  Patient ID: Derik Meza is a 79 y.o. male who presents for No chief complaint on file..  78 yo male resident of [facility] with history of falling yesterday.  Staff reports resident cannot stand on own and refuses to put any weight on LLE.          Review of Systems   Constitutional:  Negative for fever.   Respiratory: Negative.     Cardiovascular: Negative.    Musculoskeletal:  Positive for arthralgias. Negative for joint swelling.   Psychiatric/Behavioral:  Positive for confusion.        Objective  Physical Exam  Constitutional:       Comments: Resident sleeping.  Awakens with touch.  Non-verbal at this time.     Cardiovascular:      Rate and Rhythm: Normal rate and regular rhythm.   Pulmonary:      Breath sounds: No wheezing, rhonchi or rales.   Musculoskeletal:         General: No swelling.      Right lower leg: No edema.      Left lower leg: No edema.      Comments: Resident without grimacing with palpation of bilateral hips.  ROM of hips without grimacing on right.  ROM of left hip with slight grimace.  Straight leg raising without signs of pain.  No abnormal hip or leg positioning noted.           No current outpatient medications on file.     Assessment/Plan  Problem List Items Addressed This Visit    None  Visit Diagnoses       Fall, initial encounter    -  Primary    Left hip pain            PRN pain meds to be used.  Get x-ray of left hip.  May need additional x-rays for assessmetn as resident is non-verbal.  Discontinue Trazodone.  Continue to monitor.             Electronically Signed By: LOLY Chambers   10/11/24 10:01 AM

## 2024-10-11 ASSESSMENT — ENCOUNTER SYMPTOMS
JOINT SWELLING: 0
CARDIOVASCULAR NEGATIVE: 1
ARTHRALGIAS: 1
RESPIRATORY NEGATIVE: 1
FEVER: 0
CONFUSION: 1

## 2024-10-11 NOTE — PROGRESS NOTES
Subjective   Patient ID: Derik Meza is a 79 y.o. male who presents for No chief complaint on file..  80 yo male resident of \[facility]\"" with history of falling yesterday.  Staff reports resident cannot stand on own and refuses to put any weight on LLE.          Review of Systems   Constitutional:  Negative for fever.   Respiratory: Negative.     Cardiovascular: Negative.    Musculoskeletal:  Positive for arthralgias. Negative for joint swelling.   Psychiatric/Behavioral:  Positive for confusion.        Objective   Physical Exam  Constitutional:       Comments: Resident sleeping.  Awakens with touch.  Non-verbal at this time.     Cardiovascular:      Rate and Rhythm: Normal rate and regular rhythm.   Pulmonary:      Breath sounds: No wheezing, rhonchi or rales.   Musculoskeletal:         General: No swelling.      Right lower leg: No edema.      Left lower leg: No edema.      Comments: Resident without grimacing with palpation of bilateral hips.  ROM of hips without grimacing on right.  ROM of left hip with slight grimace.  Straight leg raising without signs of pain.  No abnormal hip or leg positioning noted.           No current outpatient medications on file.     Assessment/Plan   Problem List Items Addressed This Visit    None  Visit Diagnoses       Fall, initial encounter    -  Primary    Left hip pain            PRN pain meds to be used.  Get x-ray of left hip.  May need additional x-rays for assessmetn as resident is non-verbal.  Discontinue Trazodone.  Continue to monitor.